# Patient Record
Sex: FEMALE | Race: WHITE | Employment: UNEMPLOYED | URBAN - METROPOLITAN AREA
[De-identification: names, ages, dates, MRNs, and addresses within clinical notes are randomized per-mention and may not be internally consistent; named-entity substitution may affect disease eponyms.]

---

## 2018-03-13 ENCOUNTER — OFFICE VISIT (OUTPATIENT)
Dept: FAMILY MEDICINE CLINIC | Age: 48
End: 2018-03-13
Payer: COMMERCIAL

## 2018-03-13 VITALS
SYSTOLIC BLOOD PRESSURE: 110 MMHG | WEIGHT: 113 LBS | BODY MASS INDEX: 17.13 KG/M2 | DIASTOLIC BLOOD PRESSURE: 60 MMHG | OXYGEN SATURATION: 96 % | HEIGHT: 68 IN | TEMPERATURE: 98.6 F | HEART RATE: 76 BPM

## 2018-03-13 DIAGNOSIS — J98.8 RESPIRATORY INFECTION: Primary | ICD-10-CM

## 2018-03-13 PROCEDURE — 99213 OFFICE O/P EST LOW 20 MIN: CPT | Performed by: NURSE PRACTITIONER

## 2018-03-13 RX ORDER — DOXYCYCLINE HYCLATE 100 MG
100 TABLET ORAL 2 TIMES DAILY
Qty: 20 TABLET | Refills: 0 | Status: SHIPPED | OUTPATIENT
Start: 2018-03-13 | End: 2018-03-23 | Stop reason: CLARIF

## 2018-03-13 NOTE — PROGRESS NOTES
Chief Complaint:   Cough (Pt C/O cough and chest congestion x 3 days )      History of Present Illness   Source of history provided by:  patient. Jaylen Plaza is a 52 y.o. old female with a past medical history of: History reviewed. No pertinent past medical history. presents to the [facility] for nasal congestion which began 14 days ago. States her symptoms have waxed and waned but over the last 4 days her symptoms have worsened. She reports fatigue, productive cough with yellow mucous, sore throat, mild headache, and ear pressure. Denies any CP, SOB, abdominal pain, fever, chills, neck stiffness, rash, or lethargy. She has been taking mucinex which has helped her bring up congestion. ROS    Unless otherwise stated in this report or unable to obtain because of the patient's clinical or mental status as evidenced by the medical record, this patients's positive and negative responses for Review of Systems, constitutional, psych, eyes, ENT, cardiovascular, respiratory, gastrointestinal, neurological, genitourinary, musculoskeletal, integument systems and systems related to the presenting problem are either stated in the preceding or were not pertinent or were negative for the symptoms and/or complaints related to the medical problem. Past Surgical History:  has no past surgical history on file. Social History:  reports that she has never smoked. She has never used smokeless tobacco.  Family History: family history is not on file. Allergies: Patient has no known allergies. Physical Exam         VS:  /60   Pulse 76   Temp 98.6 °F (37 °C) (Temporal)   Ht 5' 8\" (1.727 m)   Wt 113 lb (51.3 kg)   LMP 02/20/2018 (Approximate)   SpO2 96%   BMI 17.18 kg/m²    Oxygen Saturation Interpretation: Normal.    Constitutional:  Alert, development consistent with age. Ears:  External Ears: Bilateral pinna normal. TM's without erythema or perforation bilaterally. Canals normal bilaterally.   No

## 2018-03-19 ENCOUNTER — OFFICE VISIT (OUTPATIENT)
Dept: FAMILY MEDICINE CLINIC | Age: 48
End: 2018-03-19
Payer: COMMERCIAL

## 2018-03-19 VITALS
TEMPERATURE: 98.1 F | DIASTOLIC BLOOD PRESSURE: 70 MMHG | OXYGEN SATURATION: 99 % | SYSTOLIC BLOOD PRESSURE: 130 MMHG | HEIGHT: 68 IN | BODY MASS INDEX: 17.43 KG/M2 | HEART RATE: 65 BPM | WEIGHT: 115 LBS

## 2018-03-19 DIAGNOSIS — J98.01 BRONCHOSPASM: ICD-10-CM

## 2018-03-19 DIAGNOSIS — J20.9 ACUTE BRONCHITIS, UNSPECIFIED ORGANISM: Primary | ICD-10-CM

## 2018-03-19 PROCEDURE — 99213 OFFICE O/P EST LOW 20 MIN: CPT | Performed by: PHYSICIAN ASSISTANT

## 2018-03-19 RX ORDER — ALBUTEROL SULFATE 90 UG/1
2 AEROSOL, METERED RESPIRATORY (INHALATION) EVERY 6 HOURS PRN
Qty: 1 INHALER | Refills: 0 | Status: SHIPPED | OUTPATIENT
Start: 2018-03-19 | End: 2018-07-25

## 2018-03-19 RX ORDER — PREDNISONE 20 MG/1
20 TABLET ORAL 2 TIMES DAILY
Qty: 10 TABLET | Refills: 0 | Status: SHIPPED | OUTPATIENT
Start: 2018-03-19 | End: 2018-03-24

## 2018-03-19 RX ORDER — BENZONATATE 100 MG/1
100 CAPSULE ORAL 3 TIMES DAILY PRN
Qty: 30 CAPSULE | Refills: 0 | Status: SHIPPED | OUTPATIENT
Start: 2018-03-19 | End: 2018-03-29

## 2018-03-23 ENCOUNTER — OFFICE VISIT (OUTPATIENT)
Dept: FAMILY MEDICINE CLINIC | Age: 48
End: 2018-03-23
Payer: COMMERCIAL

## 2018-03-23 VITALS
TEMPERATURE: 98.4 F | HEIGHT: 69 IN | BODY MASS INDEX: 16.44 KG/M2 | SYSTOLIC BLOOD PRESSURE: 118 MMHG | DIASTOLIC BLOOD PRESSURE: 78 MMHG | HEART RATE: 66 BPM | RESPIRATION RATE: 18 BRPM | WEIGHT: 111 LBS | OXYGEN SATURATION: 100 %

## 2018-03-23 DIAGNOSIS — R29.898 MUSCLE FUNCTION LOSS: ICD-10-CM

## 2018-03-23 DIAGNOSIS — R10.11 RIGHT UPPER QUADRANT ABDOMINAL PAIN: ICD-10-CM

## 2018-03-23 DIAGNOSIS — Z00.00 PHYSICAL EXAM: ICD-10-CM

## 2018-03-23 DIAGNOSIS — E78.2 MIXED HYPERLIPIDEMIA: ICD-10-CM

## 2018-03-23 DIAGNOSIS — M25.551 RIGHT HIP PAIN: ICD-10-CM

## 2018-03-23 DIAGNOSIS — M79.641 RIGHT HAND PAIN: Primary | ICD-10-CM

## 2018-03-23 DIAGNOSIS — R73.01 IFG (IMPAIRED FASTING GLUCOSE): ICD-10-CM

## 2018-03-23 DIAGNOSIS — Z12.4 SCREENING FOR CERVICAL CANCER: ICD-10-CM

## 2018-03-23 DIAGNOSIS — R53.83 FATIGUE, UNSPECIFIED TYPE: ICD-10-CM

## 2018-03-23 PROCEDURE — 99215 OFFICE O/P EST HI 40 MIN: CPT | Performed by: FAMILY MEDICINE

## 2018-03-23 ASSESSMENT — PATIENT HEALTH QUESTIONNAIRE - PHQ9
2. FEELING DOWN, DEPRESSED OR HOPELESS: 0
1. LITTLE INTEREST OR PLEASURE IN DOING THINGS: 0
SUM OF ALL RESPONSES TO PHQ QUESTIONS 1-9: 0
SUM OF ALL RESPONSES TO PHQ9 QUESTIONS 1 & 2: 0

## 2018-03-26 ENCOUNTER — HOSPITAL ENCOUNTER (OUTPATIENT)
Age: 48
Discharge: HOME | End: 2018-03-28
Payer: COMMERCIAL

## 2018-03-26 ENCOUNTER — HOSPITAL ENCOUNTER (OUTPATIENT)
Dept: GENERAL RADIOLOGY | Age: 48
Discharge: HOME | End: 2018-03-28
Payer: COMMERCIAL

## 2018-03-26 DIAGNOSIS — M25.551 RIGHT HIP PAIN: Primary | ICD-10-CM

## 2018-03-26 DIAGNOSIS — M25.551 RIGHT HIP PAIN: ICD-10-CM

## 2018-03-26 PROCEDURE — 73502 X-RAY EXAM HIP UNI 2-3 VIEWS: CPT

## 2018-03-27 PROBLEM — E78.2 MIXED HYPERLIPIDEMIA: Status: ACTIVE | Noted: 2018-03-27

## 2018-03-27 PROBLEM — Z00.00 PHYSICAL EXAM: Status: ACTIVE | Noted: 2018-03-27

## 2018-03-27 PROBLEM — R53.83 FATIGUE: Status: ACTIVE | Noted: 2018-03-27

## 2018-03-27 PROBLEM — Z12.4 SCREENING FOR CERVICAL CANCER: Status: ACTIVE | Noted: 2018-03-27

## 2018-03-27 PROBLEM — M79.641 RIGHT HAND PAIN: Status: ACTIVE | Noted: 2018-03-27

## 2018-03-27 PROBLEM — R10.11 RIGHT UPPER QUADRANT ABDOMINAL PAIN: Status: ACTIVE | Noted: 2018-03-27

## 2018-03-27 PROBLEM — M25.551 RIGHT HIP PAIN: Status: ACTIVE | Noted: 2018-03-27

## 2018-03-27 ASSESSMENT — ENCOUNTER SYMPTOMS
EYE REDNESS: 0
SINUS PAIN: 0
RESPIRATORY NEGATIVE: 1
VOMITING: 0
STRIDOR: 0
SHORTNESS OF BREATH: 0
PHOTOPHOBIA: 0
SORE THROAT: 0
BLOOD IN STOOL: 0
ABDOMINAL PAIN: 1
BACK PAIN: 0
HEMOPTYSIS: 0
EYE PAIN: 0
SPUTUM PRODUCTION: 0
DOUBLE VISION: 0
CONSTIPATION: 0
EYE DISCHARGE: 0
EYES NEGATIVE: 1
COUGH: 0
DIARRHEA: 0
NAUSEA: 0
BLURRED VISION: 0
ORTHOPNEA: 0
HEARTBURN: 0
WHEEZING: 0

## 2018-03-28 NOTE — PROGRESS NOTES
Adolph Bolaños is a 52 y.o. female. HPI/Chief C/O:  Chief Complaint   Patient presents with    Hand Pain     Pt reports that she has muscle loss in her hands- had testing done at [facility] for it    Hip Pain     Pt c/o R side hip pain, comes and goes, but has had it for years    Abdominal Pain     Pt c/o RUQ abd pain, reports the area pops out when she moves at times- concerned of hernia    Discuss Medications     MA reviewed med list with pt- pharmacy confirmed   [facility] Maintenance     Reviewed with pt     No Known Allergies  This 52year old female new pt presents for physical exam. Pt has right hand pain with muscle function loss, decreased  and strength. Pt has hyperlipidemia, and fatigue. Pt has ruq pain with occasional bulge for months. Pt c/o intermittent right hip pain for 7 years. ROS:  Review of Systems   Constitutional: Positive for malaise/fatigue. Negative for chills, diaphoresis, fever and weight loss. HENT: Negative. Negative for congestion, ear discharge, ear pain, hearing loss, nosebleeds, sinus pain, sore throat and tinnitus. Eyes: Negative. Negative for blurred vision, double vision, photophobia, pain, discharge and redness. Respiratory: Negative. Negative for cough, hemoptysis, sputum production, shortness of breath, wheezing and stridor. Cardiovascular: Negative. Negative for chest pain, palpitations, orthopnea, claudication, leg swelling and PND. Gastrointestinal: Positive for abdominal pain. Negative for blood in stool, constipation, diarrhea, heartburn, melena, nausea and vomiting. Pt has RUQ pain. Genitourinary: Negative. Negative for dysuria, flank pain, frequency, hematuria and urgency. Musculoskeletal: Positive for joint pain and myalgias. Negative for back pain, falls and neck pain. Skin: Negative. Negative for itching and rash. Neurological: Positive for tingling, sensory change and focal weakness.  Negative for dizziness, tremors, speech change, seizures, loss of consciousness, weakness and headaches. Endo/Heme/Allergies: Negative. Negative for environmental allergies and polydipsia. Does not bruise/bleed easily. Psychiatric/Behavioral: Negative. Negative for depression, hallucinations, memory loss, substance abuse and suicidal ideas. The patient is not nervous/anxious and does not have insomnia. Past Medical/Surgical Hx;  Reviewed with patient  History reviewed. No pertinent past medical history. History reviewed. No pertinent surgical history. Past Family Hx:  Reviewed with patient  History reviewed. No pertinent family history. Social Hx:  Reviewed with patient  Social History   Substance Use Topics    Smoking status: Never Smoker    Smokeless tobacco: Never Used    Alcohol use Not on file       OBJECTIVE  /78   Pulse 66   Temp 98.4 °F (36.9 °C) (Temporal)   Resp 18   Ht 5' 9\" (1.753 m)   Wt 111 lb (50.3 kg)   LMP 02/20/2018 (Approximate)   SpO2 100%   Breastfeeding? No   BMI 16.39 kg/m²     Problem List:  Edita  does not have any pertinent problems on file. PHYS EX:  Physical Exam   Constitutional: She is oriented to person, place, and time. She appears well-developed and well-nourished. No distress. HENT:   Head: Normocephalic and atraumatic. Right Ear: External ear normal.   Left Ear: External ear normal.   Nose: Nose normal.   Mouth/Throat: Oropharynx is clear and moist. No oropharyngeal exudate. Eyes: Conjunctivae and EOM are normal. Pupils are equal, round, and reactive to light. Right eye exhibits no discharge. Left eye exhibits no discharge. No scleral icterus. Neck: Normal range of motion. Neck supple. No JVD present. No tracheal deviation present. No thyromegaly present. Cardiovascular: Normal rate, regular rhythm and intact distal pulses. Exam reveals no gallop and no friction rub. Murmur heard.   Pulmonary/Chest: Effort normal and breath sounds normal. No Panel; Future  -     TSH without Reflex; Future  Not controlled. Lab. IFG (impaired fasting glucose)  -     CBC Auto Differential; Future  -     Comprehensive Metabolic Panel; Future  -     Lipid Panel; Future  -     HEMOGLOBIN A1C; Future  -     Vitamin D 25 Hydrox, D2 & D3; Future  Instructed on lab. Muscle function loss  -     CBC Auto Differential; Future  -     Comprehensive Metabolic Panel; Future  -     DARINEL; Future  -     CCP Antibodies, IGG/IGA; Future  -     Sedimentation Rate; Future  -     Rheumatoid Factor; Future  -     [address], [place] ([place])  Not controlled. Lab, ortho. Screening for cervical cancer  -     CBC Auto Differential; Future  -     Comprehensive Metabolic Panel; Future  -     [facility] - Kaushik Rodríguez DO  Instructed on pap. Right hip pain  -     CBC Auto Differential; Future  -     Comprehensive Metabolic Panel; Future  -     DARINEL; Future  -     CCP Antibodies, IGG/IGA; Future  -     Sedimentation Rate; Future  -     Rheumatoid Factor; Future  -     XR HIP RIGHT LIMITED (1 VW); Future  Not controlled. Lab, xray right hip. Right upper quadrant abdominal pain  -     CBC Auto Differential; Future  -     Comprehensive Metabolic Panel; Future  -     CT ABDOMEN PELVIS W WO CONTRAST Additional Contrast? None; Future  Stable. Lab, CT abdomen. Pt instructed if any worse go ED ASAP.       Outpatient Encounter Prescriptions as of 3/23/2018   Medication Sig Dispense Refill    benzonatate (TESSALON PERLES) 100 MG capsule Take 1 capsule by mouth 3 times daily as needed for Cough 30 capsule 0    [] predniSONE (DELTASONE) 20 MG tablet Take 1 tablet by mouth 2 times daily for 5 days 10 tablet 0    albuterol sulfate HFA (PROAIR HFA) 108 (90 Base) MCG/ACT inhaler Inhale 2 puffs into the lungs every 6 hours as needed for Wheezing 1 Inhaler 0    [DISCONTINUED] doxycycline hyclate (VIBRA-TABS) 100 MG tablet Take 1 tablet by mouth 2 times daily for 10 days 20 tablet 0     No facility-administered encounter medications on file as of 3/23/2018. Return in about 4 weeks (around 4/20/2018) for recheck fasting lab in 1 week.         Reviewed recent labs related to Edita's current problems      Discussed importance of regular Health Maintenance follow up  Health Maintenance   Topic    HIV screen     DTaP/Tdap/Td vaccine (1 - Tdap)    Cervical cancer screen     Lipid screen     Flu vaccine (1)

## 2018-03-29 ENCOUNTER — HOSPITAL ENCOUNTER (OUTPATIENT)
Age: 48
Discharge: HOME | End: 2018-03-31
Payer: COMMERCIAL

## 2018-03-29 ENCOUNTER — NURSE ONLY (OUTPATIENT)
Dept: FAMILY MEDICINE CLINIC | Age: 48
End: 2018-03-29
Payer: COMMERCIAL

## 2018-03-29 DIAGNOSIS — M25.551 RIGHT HIP PAIN: ICD-10-CM

## 2018-03-29 DIAGNOSIS — R53.83 FATIGUE, UNSPECIFIED TYPE: ICD-10-CM

## 2018-03-29 DIAGNOSIS — E78.2 MIXED HYPERLIPIDEMIA: ICD-10-CM

## 2018-03-29 DIAGNOSIS — Z12.4 SCREENING FOR CERVICAL CANCER: ICD-10-CM

## 2018-03-29 DIAGNOSIS — M79.641 RIGHT HAND PAIN: ICD-10-CM

## 2018-03-29 DIAGNOSIS — R10.11 RIGHT UPPER QUADRANT ABDOMINAL PAIN: ICD-10-CM

## 2018-03-29 DIAGNOSIS — Z00.00 PHYSICAL EXAM: ICD-10-CM

## 2018-03-29 DIAGNOSIS — R73.01 IFG (IMPAIRED FASTING GLUCOSE): ICD-10-CM

## 2018-03-29 DIAGNOSIS — R29.898 MUSCLE FUNCTION LOSS: ICD-10-CM

## 2018-03-29 LAB
ALBUMIN SERPL-MCNC: 4.1 G/DL (ref 3.5–5.2)
ALP BLD-CCNC: 65 U/L (ref 35–104)
ALT SERPL-CCNC: 9 U/L (ref 0–32)
ANION GAP SERPL CALCULATED.3IONS-SCNC: 16 MMOL/L (ref 7–16)
AST SERPL-CCNC: 16 U/L (ref 0–31)
BASOPHILS ABSOLUTE: 0.06 E9/L (ref 0–0.2)
BASOPHILS RELATIVE PERCENT: 1.3 % (ref 0–2)
BILIRUB SERPL-MCNC: 0.5 MG/DL (ref 0–1.2)
BUN BLDV-MCNC: 10 MG/DL (ref 6–20)
CALCIUM SERPL-MCNC: 9 MG/DL (ref 8.6–10.2)
CHLORIDE BLD-SCNC: 100 MMOL/L (ref 98–107)
CHOLESTEROL, TOTAL: 185 MG/DL (ref 0–199)
CO2: 24 MMOL/L (ref 22–29)
CREAT SERPL-MCNC: 0.8 MG/DL (ref 0.5–1)
EOSINOPHILS ABSOLUTE: 0.21 E9/L (ref 0.05–0.5)
EOSINOPHILS RELATIVE PERCENT: 4.6 % (ref 0–6)
GFR AFRICAN AMERICAN: >60
GFR NON-AFRICAN AMERICAN: >60 ML/MIN/1.73
GLUCOSE BLD-MCNC: 90 MG/DL (ref 74–109)
HBA1C MFR BLD: 5.9 % (ref 4.8–5.9)
HCT VFR BLD CALC: 40.2 % (ref 34–48)
HDLC SERPL-MCNC: 80 MG/DL
HEMOGLOBIN: 12.6 G/DL (ref 11.5–15.5)
IMMATURE GRANULOCYTES #: 0.01 E9/L
IMMATURE GRANULOCYTES %: 0.2 % (ref 0–5)
LDL CHOLESTEROL CALCULATED: 90 MG/DL (ref 0–99)
LYMPHOCYTES ABSOLUTE: 0.91 E9/L (ref 1.5–4)
LYMPHOCYTES RELATIVE PERCENT: 19.9 % (ref 20–42)
MCH RBC QN AUTO: 31 PG (ref 26–35)
MCHC RBC AUTO-ENTMCNC: 31.3 % (ref 32–34.5)
MCV RBC AUTO: 99 FL (ref 80–99.9)
MONOCYTES ABSOLUTE: 0.34 E9/L (ref 0.1–0.95)
MONOCYTES RELATIVE PERCENT: 7.4 % (ref 2–12)
NEUTROPHILS ABSOLUTE: 3.05 E9/L (ref 1.8–7.3)
NEUTROPHILS RELATIVE PERCENT: 66.6 % (ref 43–80)
PDW BLD-RTO: 13.5 FL (ref 11.5–15)
PLATELET # BLD: 310 E9/L (ref 130–450)
PMV BLD AUTO: 10.2 FL (ref 7–12)
POTASSIUM SERPL-SCNC: 4.3 MMOL/L (ref 3.5–5)
RBC # BLD: 4.06 E12/L (ref 3.5–5.5)
RHEUMATOID FACTOR: 12 IU/ML (ref 0–13)
SEDIMENTATION RATE, ERYTHROCYTE: 20 MM/HR (ref 0–20)
SODIUM BLD-SCNC: 140 MMOL/L (ref 132–146)
TOTAL PROTEIN: 7.5 G/DL (ref 6.4–8.3)
TRIGL SERPL-MCNC: 77 MG/DL (ref 0–149)
TSH SERPL DL<=0.05 MIU/L-ACNC: 2.4 UIU/ML (ref 0.27–4.2)
VITAMIN D 25-HYDROXY: 18 NG/ML (ref 30–100)
VLDLC SERPL CALC-MCNC: 15 MG/DL
WBC # BLD: 4.6 E9/L (ref 4.5–11.5)

## 2018-03-29 PROCEDURE — 83036 HEMOGLOBIN GLYCOSYLATED A1C: CPT

## 2018-03-29 PROCEDURE — 85025 COMPLETE CBC W/AUTO DIFF WBC: CPT

## 2018-03-29 PROCEDURE — 85651 RBC SED RATE NONAUTOMATED: CPT

## 2018-03-29 PROCEDURE — 80061 LIPID PANEL: CPT

## 2018-03-29 PROCEDURE — 80053 COMPREHEN METABOLIC PANEL: CPT

## 2018-03-29 PROCEDURE — 36415 COLL VENOUS BLD VENIPUNCTURE: CPT | Performed by: FAMILY MEDICINE

## 2018-03-29 PROCEDURE — 84443 ASSAY THYROID STIM HORMONE: CPT

## 2018-03-29 PROCEDURE — 86431 RHEUMATOID FACTOR QUANT: CPT

## 2018-03-29 PROCEDURE — 82306 VITAMIN D 25 HYDROXY: CPT

## 2018-03-29 PROCEDURE — 86200 CCP ANTIBODY: CPT

## 2018-03-29 PROCEDURE — 86038 ANTINUCLEAR ANTIBODIES: CPT

## 2018-03-30 LAB — ANTI-NUCLEAR ANTIBODY (ANA): NEGATIVE

## 2018-04-02 LAB
Lab: NORMAL
REPORT: NORMAL
THIS TEST SENT TO: NORMAL

## 2018-04-02 RX ORDER — ERGOCALCIFEROL 1.25 MG/1
50000 CAPSULE ORAL WEEKLY
Qty: 4 CAPSULE | Refills: 3 | Status: SHIPPED | OUTPATIENT
Start: 2018-04-02 | End: 2018-06-27 | Stop reason: SDUPTHER

## 2018-04-03 ENCOUNTER — TELEPHONE (OUTPATIENT)
Dept: FAMILY MEDICINE CLINIC | Age: 48
End: 2018-04-03

## 2018-04-03 DIAGNOSIS — R10.11 RUQ ABDOMINAL PAIN: Primary | ICD-10-CM

## 2018-04-09 ENCOUNTER — HOSPITAL ENCOUNTER (OUTPATIENT)
Dept: ULTRASOUND IMAGING | Age: 48
Discharge: HOME | End: 2018-04-09
Payer: COMMERCIAL

## 2018-04-09 DIAGNOSIS — R10.11 RUQ ABDOMINAL PAIN: ICD-10-CM

## 2018-04-09 PROCEDURE — 76705 ECHO EXAM OF ABDOMEN: CPT

## 2018-04-10 DIAGNOSIS — K80.11 CALCULUS OF GALLBLADDER WITH CHOLECYSTITIS WITH BILIARY OBSTRUCTION, UNSPECIFIED CHOLECYSTITIS ACUITY: Primary | ICD-10-CM

## 2018-04-16 ENCOUNTER — HOSPITAL ENCOUNTER (EMERGENCY)
Age: 48
Discharge: HOME | End: 2018-04-16
Payer: COMMERCIAL

## 2018-04-16 ENCOUNTER — APPOINTMENT (OUTPATIENT)
Dept: GENERAL RADIOLOGY | Age: 48
End: 2018-04-16
Payer: COMMERCIAL

## 2018-04-16 VITALS
OXYGEN SATURATION: 100 % | WEIGHT: 113 LBS | HEART RATE: 78 BPM | HEIGHT: 67 IN | DIASTOLIC BLOOD PRESSURE: 92 MMHG | SYSTOLIC BLOOD PRESSURE: 154 MMHG | BODY MASS INDEX: 17.74 KG/M2 | TEMPERATURE: 97.5 F | RESPIRATION RATE: 16 BRPM

## 2018-04-16 DIAGNOSIS — K80.50 CHOLEDOCHOLITHIASIS: Primary | ICD-10-CM

## 2018-04-16 LAB
ALBUMIN SERPL-MCNC: 4.7 G/DL (ref 3.5–5.2)
ALP BLD-CCNC: 73 U/L (ref 35–104)
ALT SERPL-CCNC: 10 U/L (ref 0–32)
AMYLASE: 66 U/L (ref 20–100)
ANION GAP SERPL CALCULATED.3IONS-SCNC: 11 MMOL/L (ref 7–16)
AST SERPL-CCNC: 19 U/L (ref 0–31)
BACTERIA: NORMAL /HPF
BASOPHILS ABSOLUTE: 0.04 E9/L (ref 0–0.2)
BASOPHILS RELATIVE PERCENT: 0.5 % (ref 0–2)
BILIRUB SERPL-MCNC: 0.4 MG/DL (ref 0–1.2)
BILIRUBIN URINE: NEGATIVE
BLOOD, URINE: ABNORMAL
BUN BLDV-MCNC: 8 MG/DL (ref 6–20)
CALCIUM SERPL-MCNC: 9.4 MG/DL (ref 8.6–10.2)
CHLORIDE BLD-SCNC: 97 MMOL/L (ref 98–107)
CHP ED QC CHECK: NORMAL
CLARITY: CLEAR
CO2: 30 MMOL/L (ref 22–29)
COLOR: YELLOW
CREAT SERPL-MCNC: 0.7 MG/DL (ref 0.5–1)
EOSINOPHILS ABSOLUTE: 0.07 E9/L (ref 0.05–0.5)
EOSINOPHILS RELATIVE PERCENT: 0.9 % (ref 0–6)
EPITHELIAL CELLS, UA: NORMAL /HPF
GFR AFRICAN AMERICAN: >60
GFR NON-AFRICAN AMERICAN: >60 ML/MIN/1.73
GLUCOSE BLD-MCNC: 100 MG/DL (ref 74–109)
GLUCOSE URINE: NEGATIVE MG/DL
HCT VFR BLD CALC: 41.7 % (ref 34–48)
HEMOGLOBIN: 13.8 G/DL (ref 11.5–15.5)
IMMATURE GRANULOCYTES #: 0.02 E9/L
IMMATURE GRANULOCYTES %: 0.3 % (ref 0–5)
KETONES, URINE: NEGATIVE MG/DL
LACTIC ACID: 0.9 MMOL/L (ref 0.5–2.2)
LEUKOCYTE ESTERASE, URINE: NEGATIVE
LIPASE: 30 U/L (ref 13–60)
LYMPHOCYTES ABSOLUTE: 1.04 E9/L (ref 1.5–4)
LYMPHOCYTES RELATIVE PERCENT: 14 % (ref 20–42)
MCH RBC QN AUTO: 31.9 PG (ref 26–35)
MCHC RBC AUTO-ENTMCNC: 33.1 % (ref 32–34.5)
MCV RBC AUTO: 96.5 FL (ref 80–99.9)
MONOCYTES ABSOLUTE: 0.41 E9/L (ref 0.1–0.95)
MONOCYTES RELATIVE PERCENT: 5.5 % (ref 2–12)
NEUTROPHILS ABSOLUTE: 5.87 E9/L (ref 1.8–7.3)
NEUTROPHILS RELATIVE PERCENT: 78.8 % (ref 43–80)
NITRITE, URINE: NEGATIVE
PDW BLD-RTO: 13.4 FL (ref 11.5–15)
PH UA: 7 (ref 5–9)
PLATELET # BLD: 303 E9/L (ref 130–450)
PMV BLD AUTO: 9.5 FL (ref 7–12)
POTASSIUM SERPL-SCNC: 3.8 MMOL/L (ref 3.5–5)
PREGNANCY TEST URINE, POC: NORMAL
PROTEIN UA: NEGATIVE MG/DL
RBC # BLD: 4.32 E12/L (ref 3.5–5.5)
RBC UA: NORMAL /HPF (ref 0–2)
SODIUM BLD-SCNC: 138 MMOL/L (ref 132–146)
SPECIFIC GRAVITY UA: <=1.005 (ref 1–1.03)
TOTAL PROTEIN: 8.8 G/DL (ref 6.4–8.3)
UROBILINOGEN, URINE: 0.2 E.U./DL
WBC # BLD: 7.5 E9/L (ref 4.5–11.5)
WBC UA: NORMAL /HPF (ref 0–5)

## 2018-04-16 PROCEDURE — 74022 RADEX COMPL AQT ABD SERIES: CPT

## 2018-04-16 PROCEDURE — 83605 ASSAY OF LACTIC ACID: CPT

## 2018-04-16 PROCEDURE — 82150 ASSAY OF AMYLASE: CPT

## 2018-04-16 PROCEDURE — 81001 URINALYSIS AUTO W/SCOPE: CPT

## 2018-04-16 PROCEDURE — 87088 URINE BACTERIA CULTURE: CPT

## 2018-04-16 PROCEDURE — 36415 COLL VENOUS BLD VENIPUNCTURE: CPT

## 2018-04-16 PROCEDURE — 80053 COMPREHEN METABOLIC PANEL: CPT

## 2018-04-16 PROCEDURE — 99284 EMERGENCY DEPT VISIT MOD MDM: CPT | Performed by: SURGERY

## 2018-04-16 PROCEDURE — 85025 COMPLETE CBC W/AUTO DIFF WBC: CPT

## 2018-04-16 PROCEDURE — 99284 EMERGENCY DEPT VISIT MOD MDM: CPT

## 2018-04-16 PROCEDURE — 83690 ASSAY OF LIPASE: CPT

## 2018-04-16 ASSESSMENT — ENCOUNTER SYMPTOMS
SORE THROAT: 0
SINUS PRESSURE: 0
BACK PAIN: 0
WHEEZING: 0
ABDOMINAL PAIN: 1
DIARRHEA: 0
SHORTNESS OF BREATH: 0
VOMITING: 0
EYE PAIN: 0
COUGH: 1
EYE REDNESS: 0
EYE DISCHARGE: 0
NAUSEA: 0
ABDOMINAL DISTENTION: 0

## 2018-04-16 ASSESSMENT — PAIN SCALES - GENERAL: PAINLEVEL_OUTOF10: 8

## 2018-04-17 ENCOUNTER — TELEPHONE (OUTPATIENT)
Dept: SURGERY | Age: 48
End: 2018-04-17

## 2018-04-18 ENCOUNTER — ANESTHESIA EVENT (OUTPATIENT)
Dept: OPERATING ROOM | Age: 48
End: 2018-04-18
Payer: COMMERCIAL

## 2018-04-18 LAB — URINE CULTURE, ROUTINE: NORMAL

## 2018-04-19 ENCOUNTER — HOSPITAL ENCOUNTER (OUTPATIENT)
Dept: GENERAL RADIOLOGY | Age: 48
Discharge: HOME | End: 2018-04-21
Payer: COMMERCIAL

## 2018-04-19 ENCOUNTER — HOSPITAL ENCOUNTER (OUTPATIENT)
Age: 48
Setting detail: OUTPATIENT SURGERY
Discharge: HOME | End: 2018-04-19
Attending: SURGERY | Admitting: SURGERY
Payer: COMMERCIAL

## 2018-04-19 ENCOUNTER — ANESTHESIA (OUTPATIENT)
Dept: OPERATING ROOM | Age: 48
End: 2018-04-19
Payer: COMMERCIAL

## 2018-04-19 VITALS
TEMPERATURE: 97.1 F | SYSTOLIC BLOOD PRESSURE: 110 MMHG | BODY MASS INDEX: 17.43 KG/M2 | HEIGHT: 68 IN | OXYGEN SATURATION: 100 % | RESPIRATION RATE: 20 BRPM | WEIGHT: 115 LBS | HEART RATE: 61 BPM | DIASTOLIC BLOOD PRESSURE: 60 MMHG

## 2018-04-19 VITALS
SYSTOLIC BLOOD PRESSURE: 111 MMHG | TEMPERATURE: 98.6 F | DIASTOLIC BLOOD PRESSURE: 60 MMHG | OXYGEN SATURATION: 100 % | RESPIRATION RATE: 10 BRPM

## 2018-04-19 DIAGNOSIS — K80.10 CALCULUS OF GALLBLADDER WITH CHOLECYSTITIS WITHOUT BILIARY OBSTRUCTION, UNSPECIFIED CHOLECYSTITIS ACUITY: ICD-10-CM

## 2018-04-19 DIAGNOSIS — K80.20 SYMPTOMATIC CHOLELITHIASIS: Primary | ICD-10-CM

## 2018-04-19 LAB — HCG(URINE) PREGNANCY TEST: NEGATIVE

## 2018-04-19 PROCEDURE — 2580000003 HC RX 258: Performed by: SURGERY

## 2018-04-19 PROCEDURE — 6370000000 HC RX 637 (ALT 250 FOR IP): Performed by: ANESTHESIOLOGY

## 2018-04-19 PROCEDURE — 3700000000 HC ANESTHESIA ATTENDED CARE: Performed by: SURGERY

## 2018-04-19 PROCEDURE — 74300 X-RAY BILE DUCTS/PANCREAS: CPT

## 2018-04-19 PROCEDURE — 6360000002 HC RX W HCPCS: Performed by: SURGERY

## 2018-04-19 PROCEDURE — C1894 INTRO/SHEATH, NON-LASER: HCPCS | Performed by: SURGERY

## 2018-04-19 PROCEDURE — 2580000003 HC RX 258: Performed by: NURSE ANESTHETIST, CERTIFIED REGISTERED

## 2018-04-19 PROCEDURE — 7100000010 HC PHASE II RECOVERY - FIRST 15 MIN: Performed by: SURGERY

## 2018-04-19 PROCEDURE — 88304 TISSUE EXAM BY PATHOLOGIST: CPT

## 2018-04-19 PROCEDURE — 7100000000 HC PACU RECOVERY - FIRST 15 MIN: Performed by: SURGERY

## 2018-04-19 PROCEDURE — 2500000003 HC RX 250 WO HCPCS: Performed by: NURSE ANESTHETIST, CERTIFIED REGISTERED

## 2018-04-19 PROCEDURE — 3600000014 HC SURGERY LEVEL 4 ADDTL 15MIN: Performed by: SURGERY

## 2018-04-19 PROCEDURE — 6360000002 HC RX W HCPCS: Performed by: NURSE ANESTHETIST, CERTIFIED REGISTERED

## 2018-04-19 PROCEDURE — 81025 URINE PREGNANCY TEST: CPT

## 2018-04-19 PROCEDURE — 7100000011 HC PHASE II RECOVERY - ADDTL 15 MIN: Performed by: SURGERY

## 2018-04-19 PROCEDURE — 7100000001 HC PACU RECOVERY - ADDTL 15 MIN: Performed by: SURGERY

## 2018-04-19 PROCEDURE — 3600000004 HC SURGERY LEVEL 4 BASE: Performed by: SURGERY

## 2018-04-19 PROCEDURE — 2500000003 HC RX 250 WO HCPCS: Performed by: SURGERY

## 2018-04-19 PROCEDURE — 47563 LAPARO CHOLECYSTECTOMY/GRAPH: CPT | Performed by: SURGERY

## 2018-04-19 PROCEDURE — 3700000001 HC ADD 15 MINUTES (ANESTHESIA): Performed by: SURGERY

## 2018-04-19 PROCEDURE — 2709999900 HC NON-CHARGEABLE SUPPLY: Performed by: SURGERY

## 2018-04-19 PROCEDURE — 6360000004 HC RX CONTRAST MEDICATION: Performed by: SURGERY

## 2018-04-19 RX ORDER — OXYCODONE HYDROCHLORIDE AND ACETAMINOPHEN 5; 325 MG/1; MG/1
1 TABLET ORAL ONCE
Status: COMPLETED | OUTPATIENT
Start: 2018-04-19 | End: 2018-04-19

## 2018-04-19 RX ORDER — SODIUM CHLORIDE, SODIUM LACTATE, POTASSIUM CHLORIDE, CALCIUM CHLORIDE 600; 310; 30; 20 MG/100ML; MG/100ML; MG/100ML; MG/100ML
INJECTION, SOLUTION INTRAVENOUS CONTINUOUS PRN
Status: DISCONTINUED | OUTPATIENT
Start: 2018-04-19 | End: 2018-04-19 | Stop reason: SDUPTHER

## 2018-04-19 RX ORDER — GLYCOPYRROLATE 1 MG/5 ML
SYRINGE (ML) INTRAVENOUS PRN
Status: DISCONTINUED | OUTPATIENT
Start: 2018-04-19 | End: 2018-04-19 | Stop reason: SDUPTHER

## 2018-04-19 RX ORDER — MEPERIDINE HYDROCHLORIDE 25 MG/ML
12.5 INJECTION INTRAMUSCULAR; INTRAVENOUS; SUBCUTANEOUS EVERY 5 MIN PRN
Status: DISCONTINUED | OUTPATIENT
Start: 2018-04-19 | End: 2018-04-19 | Stop reason: HOSPADM

## 2018-04-19 RX ORDER — NEOSTIGMINE METHYLSULFATE 0.5 MG/ML
INJECTION, SOLUTION INTRAVENOUS PRN
Status: DISCONTINUED | OUTPATIENT
Start: 2018-04-19 | End: 2018-04-19 | Stop reason: SDUPTHER

## 2018-04-19 RX ORDER — MIDAZOLAM HYDROCHLORIDE 1 MG/ML
INJECTION INTRAMUSCULAR; INTRAVENOUS PRN
Status: DISCONTINUED | OUTPATIENT
Start: 2018-04-19 | End: 2018-04-19 | Stop reason: SDUPTHER

## 2018-04-19 RX ORDER — ROCURONIUM BROMIDE 10 MG/ML
INJECTION, SOLUTION INTRAVENOUS PRN
Status: DISCONTINUED | OUTPATIENT
Start: 2018-04-19 | End: 2018-04-19 | Stop reason: SDUPTHER

## 2018-04-19 RX ORDER — SODIUM CHLORIDE 0.9 % (FLUSH) 0.9 %
10 SYRINGE (ML) INJECTION EVERY 12 HOURS SCHEDULED
Status: DISCONTINUED | OUTPATIENT
Start: 2018-04-19 | End: 2018-04-19 | Stop reason: HOSPADM

## 2018-04-19 RX ORDER — FENTANYL CITRATE 50 UG/ML
INJECTION, SOLUTION INTRAMUSCULAR; INTRAVENOUS PRN
Status: DISCONTINUED | OUTPATIENT
Start: 2018-04-19 | End: 2018-04-19 | Stop reason: SDUPTHER

## 2018-04-19 RX ORDER — SODIUM CHLORIDE 9 MG/ML
INJECTION, SOLUTION INTRAVENOUS CONTINUOUS
Status: DISCONTINUED | OUTPATIENT
Start: 2018-04-19 | End: 2018-04-19 | Stop reason: HOSPADM

## 2018-04-19 RX ORDER — OXYCODONE HYDROCHLORIDE AND ACETAMINOPHEN 5; 325 MG/1; MG/1
1 TABLET ORAL EVERY 6 HOURS PRN
Qty: 20 TABLET | Refills: 0 | Status: SHIPPED | OUTPATIENT
Start: 2018-04-19 | End: 2018-04-26

## 2018-04-19 RX ORDER — SODIUM CHLORIDE 0.9 % (FLUSH) 0.9 %
10 SYRINGE (ML) INJECTION PRN
Status: DISCONTINUED | OUTPATIENT
Start: 2018-04-19 | End: 2018-04-19 | Stop reason: HOSPADM

## 2018-04-19 RX ORDER — PROPOFOL 10 MG/ML
INJECTION, EMULSION INTRAVENOUS PRN
Status: DISCONTINUED | OUTPATIENT
Start: 2018-04-19 | End: 2018-04-19 | Stop reason: SDUPTHER

## 2018-04-19 RX ORDER — IBUPROFEN 200 MG
800 TABLET ORAL EVERY 6 HOURS PRN
Qty: 50 TABLET | Refills: 0 | Status: SHIPPED | OUTPATIENT
Start: 2018-04-19 | End: 2018-05-02

## 2018-04-19 RX ORDER — BUPIVACAINE HYDROCHLORIDE AND EPINEPHRINE 2.5; 5 MG/ML; UG/ML
INJECTION, SOLUTION EPIDURAL; INFILTRATION; INTRACAUDAL; PERINEURAL PRN
Status: DISCONTINUED | OUTPATIENT
Start: 2018-04-19 | End: 2018-04-19 | Stop reason: HOSPADM

## 2018-04-19 RX ORDER — DEXAMETHASONE SODIUM PHOSPHATE 10 MG/ML
INJECTION INTRAMUSCULAR; INTRAVENOUS PRN
Status: DISCONTINUED | OUTPATIENT
Start: 2018-04-19 | End: 2018-04-19 | Stop reason: SDUPTHER

## 2018-04-19 RX ORDER — DOCUSATE SODIUM 100 MG/1
100 CAPSULE, LIQUID FILLED ORAL DAILY PRN
Qty: 30 CAPSULE | Refills: 0 | Status: SHIPPED | OUTPATIENT
Start: 2018-04-19 | End: 2018-05-02

## 2018-04-19 RX ORDER — ONDANSETRON 2 MG/ML
INJECTION INTRAMUSCULAR; INTRAVENOUS PRN
Status: DISCONTINUED | OUTPATIENT
Start: 2018-04-19 | End: 2018-04-19 | Stop reason: SDUPTHER

## 2018-04-19 RX ADMIN — FENTANYL CITRATE 50 MCG: 50 INJECTION, SOLUTION INTRAMUSCULAR; INTRAVENOUS at 09:25

## 2018-04-19 RX ADMIN — PROPOFOL 140 MG: 10 INJECTION, EMULSION INTRAVENOUS at 09:12

## 2018-04-19 RX ADMIN — FENTANYL CITRATE 100 MCG: 50 INJECTION, SOLUTION INTRAMUSCULAR; INTRAVENOUS at 09:12

## 2018-04-19 RX ADMIN — SODIUM CHLORIDE: 9 INJECTION, SOLUTION INTRAVENOUS at 08:09

## 2018-04-19 RX ADMIN — SODIUM CHLORIDE, POTASSIUM CHLORIDE, SODIUM LACTATE AND CALCIUM CHLORIDE: 600; 310; 30; 20 INJECTION, SOLUTION INTRAVENOUS at 09:07

## 2018-04-19 RX ADMIN — Medication 0.6 MG: at 09:44

## 2018-04-19 RX ADMIN — CEFAZOLIN SODIUM 2 G: 2 SOLUTION INTRAVENOUS at 09:07

## 2018-04-19 RX ADMIN — LIDOCAINE HYDROCHLORIDE 40 MG: 20 INJECTION, SOLUTION INTRAVENOUS at 09:12

## 2018-04-19 RX ADMIN — FENTANYL CITRATE 25 MCG: 50 INJECTION, SOLUTION INTRAMUSCULAR; INTRAVENOUS at 09:45

## 2018-04-19 RX ADMIN — ONDANSETRON 4 MG: 2 INJECTION INTRAMUSCULAR; INTRAVENOUS at 09:33

## 2018-04-19 RX ADMIN — FENTANYL CITRATE 25 MCG: 50 INJECTION, SOLUTION INTRAMUSCULAR; INTRAVENOUS at 09:46

## 2018-04-19 RX ADMIN — FENTANYL CITRATE 50 MCG: 50 INJECTION, SOLUTION INTRAMUSCULAR; INTRAVENOUS at 09:19

## 2018-04-19 RX ADMIN — MIDAZOLAM 2 MG: 1 INJECTION INTRAMUSCULAR; INTRAVENOUS at 09:02

## 2018-04-19 RX ADMIN — NEOSTIGMINE METHYLSULFATE 3 MG: 0.5 INJECTION INTRAVENOUS at 09:44

## 2018-04-19 RX ADMIN — ROCURONIUM BROMIDE 40 MG: 10 INJECTION, SOLUTION INTRAVENOUS at 09:12

## 2018-04-19 RX ADMIN — DEXAMETHASONE SODIUM PHOSPHATE 10 MG: 10 INJECTION INTRAMUSCULAR; INTRAVENOUS at 09:32

## 2018-04-19 RX ADMIN — OXYCODONE HYDROCHLORIDE AND ACETAMINOPHEN 1 TABLET: 5; 325 TABLET ORAL at 11:18

## 2018-04-19 ASSESSMENT — PULMONARY FUNCTION TESTS
PIF_VALUE: 23
PIF_VALUE: 12
PIF_VALUE: 26
PIF_VALUE: 5
PIF_VALUE: 12
PIF_VALUE: 3
PIF_VALUE: 15
PIF_VALUE: 24
PIF_VALUE: 24
PIF_VALUE: 15
PIF_VALUE: 24
PIF_VALUE: 24
PIF_VALUE: 16
PIF_VALUE: 25
PIF_VALUE: 20
PIF_VALUE: 15
PIF_VALUE: 26
PIF_VALUE: 25
PIF_VALUE: 4
PIF_VALUE: 30
PIF_VALUE: 26
PIF_VALUE: 19
PIF_VALUE: 25
PIF_VALUE: 1
PIF_VALUE: 24
PIF_VALUE: 16
PIF_VALUE: 3
PIF_VALUE: 18
PIF_VALUE: 26
PIF_VALUE: 26
PIF_VALUE: 12
PIF_VALUE: 15
PIF_VALUE: 25
PIF_VALUE: 17
PIF_VALUE: 14
PIF_VALUE: 28
PIF_VALUE: 0
PIF_VALUE: 17
PIF_VALUE: 25
PIF_VALUE: 25
PIF_VALUE: 18
PIF_VALUE: 12
PIF_VALUE: 17
PIF_VALUE: 24
PIF_VALUE: 22

## 2018-04-19 ASSESSMENT — PAIN DESCRIPTION - LOCATION
LOCATION: ABDOMEN
LOCATION: ABDOMEN

## 2018-04-19 ASSESSMENT — PAIN - FUNCTIONAL ASSESSMENT: PAIN_FUNCTIONAL_ASSESSMENT: 0-10

## 2018-04-19 ASSESSMENT — PAIN DESCRIPTION - PAIN TYPE
TYPE: SURGICAL PAIN
TYPE: SURGICAL PAIN

## 2018-04-19 ASSESSMENT — PAIN SCALES - GENERAL
PAINLEVEL_OUTOF10: 0
PAINLEVEL_OUTOF10: 0
PAINLEVEL_OUTOF10: 2
PAINLEVEL_OUTOF10: 0

## 2018-04-26 PROBLEM — Z00.00 PHYSICAL EXAM: Status: RESOLVED | Noted: 2018-03-27 | Resolved: 2018-04-26

## 2018-04-26 PROBLEM — Z12.4 SCREENING FOR CERVICAL CANCER: Status: RESOLVED | Noted: 2018-03-27 | Resolved: 2018-04-26

## 2018-04-27 ENCOUNTER — TELEPHONE (OUTPATIENT)
Dept: ORTHOPEDIC SURGERY | Age: 48
End: 2018-04-27

## 2018-04-27 DIAGNOSIS — M79.641 RIGHT HAND PAIN: Primary | ICD-10-CM

## 2018-04-30 ENCOUNTER — OFFICE VISIT (OUTPATIENT)
Dept: ORTHOPEDIC SURGERY | Age: 48
End: 2018-04-30
Payer: COMMERCIAL

## 2018-04-30 VITALS
RESPIRATION RATE: 20 BRPM | SYSTOLIC BLOOD PRESSURE: 134 MMHG | DIASTOLIC BLOOD PRESSURE: 78 MMHG | HEIGHT: 68 IN | WEIGHT: 115 LBS | TEMPERATURE: 97.3 F | BODY MASS INDEX: 17.43 KG/M2 | HEART RATE: 65 BPM

## 2018-04-30 DIAGNOSIS — G56.03 BILATERAL CARPAL TUNNEL SYNDROME: ICD-10-CM

## 2018-04-30 DIAGNOSIS — M62.541 ATROPHY OF MUSCLE OF RIGHT HAND: ICD-10-CM

## 2018-04-30 DIAGNOSIS — G56.23 ULNAR NEUROPATHY OF BOTH UPPER EXTREMITIES: Primary | ICD-10-CM

## 2018-04-30 PROCEDURE — 99203 OFFICE O/P NEW LOW 30 MIN: CPT | Performed by: ORTHOPAEDIC SURGERY

## 2018-05-02 ENCOUNTER — OFFICE VISIT (OUTPATIENT)
Dept: SURGERY | Age: 48
End: 2018-05-02

## 2018-05-02 VITALS
TEMPERATURE: 98.6 F | HEART RATE: 82 BPM | SYSTOLIC BLOOD PRESSURE: 132 MMHG | HEIGHT: 68 IN | DIASTOLIC BLOOD PRESSURE: 78 MMHG | BODY MASS INDEX: 17.43 KG/M2 | WEIGHT: 115 LBS | RESPIRATION RATE: 12 BRPM

## 2018-05-02 DIAGNOSIS — Z90.49 S/P LAPAROSCOPIC CHOLECYSTECTOMY: Primary | ICD-10-CM

## 2018-05-02 DIAGNOSIS — K81.1 CHRONIC CHOLECYSTITIS: ICD-10-CM

## 2018-05-02 PROCEDURE — 99024 POSTOP FOLLOW-UP VISIT: CPT | Performed by: SURGERY

## 2018-05-08 ENCOUNTER — OFFICE VISIT (OUTPATIENT)
Dept: FAMILY MEDICINE CLINIC | Age: 48
End: 2018-05-08
Payer: COMMERCIAL

## 2018-05-08 ENCOUNTER — NURSE ONLY (OUTPATIENT)
Dept: FAMILY MEDICINE CLINIC | Age: 48
End: 2018-05-08

## 2018-05-08 VITALS
HEART RATE: 69 BPM | WEIGHT: 109.4 LBS | OXYGEN SATURATION: 97 % | DIASTOLIC BLOOD PRESSURE: 80 MMHG | BODY MASS INDEX: 16.58 KG/M2 | RESPIRATION RATE: 18 BRPM | HEIGHT: 68 IN | SYSTOLIC BLOOD PRESSURE: 118 MMHG

## 2018-05-08 DIAGNOSIS — E78.2 MIXED HYPERLIPIDEMIA: ICD-10-CM

## 2018-05-08 DIAGNOSIS — R79.89 LOW VITAMIN D LEVEL: ICD-10-CM

## 2018-05-08 DIAGNOSIS — M79.641 RIGHT HAND PAIN: ICD-10-CM

## 2018-05-08 DIAGNOSIS — R73.01 IFG (IMPAIRED FASTING GLUCOSE): ICD-10-CM

## 2018-05-08 DIAGNOSIS — R53.83 FATIGUE, UNSPECIFIED TYPE: ICD-10-CM

## 2018-05-08 DIAGNOSIS — M62.541 ATROPHY OF MUSCLE OF RIGHT HAND: Primary | ICD-10-CM

## 2018-05-08 DIAGNOSIS — M25.551 RIGHT HIP PAIN: ICD-10-CM

## 2018-05-08 PROBLEM — R10.11 RIGHT UPPER QUADRANT ABDOMINAL PAIN: Status: RESOLVED | Noted: 2018-03-27 | Resolved: 2018-05-08

## 2018-05-08 PROBLEM — Z90.49 HISTORY OF CHOLECYSTECTOMY: Status: ACTIVE | Noted: 2018-05-08

## 2018-05-08 PROCEDURE — 99213 OFFICE O/P EST LOW 20 MIN: CPT | Performed by: FAMILY MEDICINE

## 2018-05-08 ASSESSMENT — ENCOUNTER SYMPTOMS
NAUSEA: 0
SINUS PAIN: 0
STRIDOR: 0
WHEEZING: 0
HEARTBURN: 0
HEMOPTYSIS: 0
PHOTOPHOBIA: 0
BLOOD IN STOOL: 0
VOMITING: 0
ABDOMINAL PAIN: 0
SORE THROAT: 0
EYE REDNESS: 0
BLURRED VISION: 0
EYE DISCHARGE: 0
RESPIRATORY NEGATIVE: 1
COUGH: 0
EYES NEGATIVE: 1
ORTHOPNEA: 0
SPUTUM PRODUCTION: 0
BACK PAIN: 0
SHORTNESS OF BREATH: 0
DIARRHEA: 0
CONSTIPATION: 0
DOUBLE VISION: 0
EYE PAIN: 0
GASTROINTESTINAL NEGATIVE: 1

## 2018-05-08 ASSESSMENT — PATIENT HEALTH QUESTIONNAIRE - PHQ9
1. LITTLE INTEREST OR PLEASURE IN DOING THINGS: 1
SUM OF ALL RESPONSES TO PHQ QUESTIONS 1-9: 2
SUM OF ALL RESPONSES TO PHQ9 QUESTIONS 1 & 2: 2
2. FEELING DOWN, DEPRESSED OR HOPELESS: 1

## 2018-05-24 ENCOUNTER — OFFICE VISIT (OUTPATIENT)
Dept: PHYSICAL MEDICINE AND REHAB | Age: 48
End: 2018-05-24
Payer: COMMERCIAL

## 2018-05-24 VITALS — BODY MASS INDEX: 17.13 KG/M2 | HEIGHT: 68 IN | WEIGHT: 113 LBS

## 2018-05-24 DIAGNOSIS — G56.03 BILATERAL CARPAL TUNNEL SYNDROME: ICD-10-CM

## 2018-05-24 DIAGNOSIS — G56.23 ULNAR NEUROPATHY OF BOTH UPPER EXTREMITIES: ICD-10-CM

## 2018-05-24 PROCEDURE — 95886 MUSC TEST DONE W/N TEST COMP: CPT | Performed by: PHYSICAL MEDICINE & REHABILITATION

## 2018-05-24 PROCEDURE — 95913 NRV CNDJ TEST 13/> STUDIES: CPT | Performed by: PHYSICAL MEDICINE & REHABILITATION

## 2018-05-24 PROCEDURE — 99202 OFFICE O/P NEW SF 15 MIN: CPT | Performed by: PHYSICAL MEDICINE & REHABILITATION

## 2018-06-26 ENCOUNTER — OFFICE VISIT (OUTPATIENT)
Dept: ORTHOPEDIC SURGERY | Age: 48
End: 2018-06-26
Payer: COMMERCIAL

## 2018-06-26 VITALS
DIASTOLIC BLOOD PRESSURE: 79 MMHG | HEART RATE: 61 BPM | TEMPERATURE: 98.1 F | RESPIRATION RATE: 18 BRPM | SYSTOLIC BLOOD PRESSURE: 133 MMHG

## 2018-06-26 DIAGNOSIS — M62.541 ATROPHY OF MUSCLE OF RIGHT HAND: Primary | ICD-10-CM

## 2018-06-26 DIAGNOSIS — G56.01 CARPAL TUNNEL SYNDROME OF RIGHT WRIST: ICD-10-CM

## 2018-06-26 DIAGNOSIS — G56.21 ULNAR NEUROPATHY AT ELBOW OF RIGHT UPPER EXTREMITY: ICD-10-CM

## 2018-06-26 PROCEDURE — 99214 OFFICE O/P EST MOD 30 MIN: CPT | Performed by: ORTHOPAEDIC SURGERY

## 2018-06-27 RX ORDER — ERGOCALCIFEROL 1.25 MG/1
50000 CAPSULE ORAL WEEKLY
Qty: 4 CAPSULE | Refills: 3 | Status: SHIPPED | OUTPATIENT
Start: 2018-06-27 | End: 2018-07-25

## 2018-07-12 ENCOUNTER — PREP FOR PROCEDURE (OUTPATIENT)
Dept: ORTHOPEDIC SURGERY | Age: 48
End: 2018-07-12

## 2018-07-12 RX ORDER — SODIUM CHLORIDE 0.9 % (FLUSH) 0.9 %
10 SYRINGE (ML) INJECTION EVERY 12 HOURS SCHEDULED
Status: CANCELLED | OUTPATIENT
Start: 2018-07-12 | End: 2019-07-12

## 2018-07-12 RX ORDER — SODIUM CHLORIDE 9 MG/ML
INJECTION, SOLUTION INTRAVENOUS CONTINUOUS
Status: CANCELLED | OUTPATIENT
Start: 2018-07-12 | End: 2019-07-12

## 2018-07-12 RX ORDER — SODIUM CHLORIDE 0.9 % (FLUSH) 0.9 %
10 SYRINGE (ML) INJECTION PRN
Status: CANCELLED | OUTPATIENT
Start: 2018-07-12 | End: 2019-07-12

## 2018-07-25 NOTE — PROGRESS NOTES
[facility] PRE-ADMISSION TESTING INSTRUCTIONS    The Preadmission Testing patient is instructed accordingly using the following criteria (check applicable):    ARRIVAL INSTRUCTIONS:  [x] Parking the day of Surgery is located in the Main Entrance lot. Upon entering the door, make an immediate right to the surgery reception desk    [x] Complimentary [facility] Parking is available Monday through Friday 6 am to 6 pm    [x] Bring photo ID and insurance card    [] Bring in a copy of Living will or Durable Power of  papers. [x] Please be sure to arrange transportation to and from the hospital    [x] Please arrange for someone to be with you for the 24 hour period post procedure due to having anesthesia      GENERAL INSTRUCTIONS:    [x] Nothing by mouth after midnight, including gum, candy, mints or water    [x] You may brush your teeth, but do not swallow any water    [] Take medications as instructed with 1-2 oz of water    [] Stop herbal supplements and vitamins 5 days prior to procedure    [] Follow preop dosing of blood thinners per physician instructions    [] Take 1/2 dose of evening insulin, but no insulin after midnight    [] No oral diabetic medications after midnight    [] If diabetic and have low blood sugar or feel symptomatic, take 1-2oz apple juice only    [] Bring inhalers day of surgery    [] Bring C-PAP/ Bi-Pap day of surgery    [x] Bring urine specimen day of surgery    [x] Shower or bath with soap, lather and rinse well, AM of Surgery, no lotion, powders or creams     [] Follow bowel prep as instructed per surgeon    [] No tobacco products within 24 hours of surgery     [x] No alcohol or illegal drug use within 24 hours of surgery.     [x] Jewelry, body piercing's, eyeglasses, contact lenses and dentures are not permitted into surgery (bring cases)      [x] Please do not wear any nail polish, make up or hair products on the day of surgery    [x] If not already done, you

## 2018-08-07 NOTE — H&P
Department of Orthopedic Surgery  History and Physical        CHIEF COMPLAINT:  Right hand weakness     HISTORY OF PRESENT ILLNESS:                 The patient is a RHD 52 y.o. female who presents with right hand weakness. Patient states that over 5 years she has noticed right hand weakness with muscle wasting between her thumb and index finger webspace. She does have occasional numbness and tingling to all of her fingers. She states she had a nerve conduction study test and an EMG a year and half ago. Those results are not available to us. She does state that at that time they recommended surgical intervention. She did not pursue surgery at that time due to insurance reasons. She's had no treatments for this in the past. She has a family history of RA. She recently was tested and this was negative. She currently is unemployed.     Patient returns today follow-up EMG nerve nurses where she had completed. The review the results with her. She does have a decrease velocity across the elbow dropping down to 29 m/s. There is also increased 3+ fibrillations and sharp waves involving the 1st dorsal interosseous with decreased recruitment. In addition there is slowing across the carpal tunnel on the right side as well. Patient continues to complain of loss of function with cramping and nocturnal symptoms involving the upper extremity. She is interested in pursuing surgical options at this time.        Past Medical History:    Past Medical History             Diagnosis Date    Heart murmur       slight    Sinus drainage           Past Surgical History:    Past Surgical History       Procedure Laterality Date    DC LAP,CHOLECYSTECTOMY/GRAPH N/A 4/19/2018     CHOLECYSTECTOMY LAPAROSCOPIC WITH IOC performed by Boni Joyce MD at [address]         Current Medications:   Current Hospital Medications   No current facility-administered medications for this visit.       Allergies:  Patient has no known allergies.     Social positive Francisco's, Negative elbow flexion compression test, negative Finkelstein's, negative CMC grind, negative tenderness over the A1 pulleys. Positive wasting of the musculature in the 1st webspace of the hand. Positive clawing of the small finger. Native Wartenberg's. Median, ulnar, radial nerves intact light touch. Full flexion of the fingers. Gross motor 5 out of 5. Otherwise neurovascularly intact.     Left upper extremity: Positive Tinel's at the cubital tunnel, positive Tinel's of the carpal tunnel, positive Francisco's, negative elbow flexion compression test.     DATA:    CBC:         Lab Results   Component Value Date     WBC 7.5 04/16/2018     RBC 4.32 04/16/2018     HGB 13.8 04/16/2018     HCT 41.7 04/16/2018     MCV 96.5 04/16/2018     MCH 31.9 04/16/2018     MCHC 33.1 04/16/2018     RDW 13.4 04/16/2018      04/16/2018     MPV 9.5 04/16/2018      PT/INR:  No results found for: PROTIME, INR     Radiology Review:  X-rays of the right hand . 3 views: AP, lateral, oblique demonstrate no acute fractures or dislocations of the right hand. Soft tissues within normal limits. Joint space is well-preserved.        X-rays of the bilateral elbows were obtained today in the office. AP and lateral views of bilateral elbows are negative for any acute fracture dislocations. Soft tissues within normal limits. Impression of bilateral elbow x-rays: Negative for acute fractures or dislocations. 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 She is interested in pursuing surgery. Plan for right ulnar nerve decompression at elbow with anterior submuscular transposition given her ulnar nerve instability at elbow and body habitus. In addition we'll plan for carpal tunnel release distally. Postoperative expectations and healing were explained. She fully understands that she will not have any significant reversal of her hand atrophy. Surgery is designed to prevent further atrophy from occurring.     I explained the risks, benefits, alternatives and complications of surgery with the patient including but not limited to the risks of infection, possible damage to nerves, vessels, or tendons, stiffness, loss of range of motion, scar sensitivity, wound healing complications, worsening symptoms, possible need for therapy, as well as the possible need further surgery and unanticipated complications. The patient voiced understanding and all questions were answered. The patient elected to proceed with surgical intervention. History and Physical Update     Patient was seen and examined. Patient's history and physical was reviewed with the patient. There has been no significant interval changes.

## 2018-08-08 ENCOUNTER — ANESTHESIA EVENT (OUTPATIENT)
Dept: OPERATING ROOM | Age: 48
End: 2018-08-08
Payer: COMMERCIAL

## 2018-08-08 ENCOUNTER — ANESTHESIA (OUTPATIENT)
Dept: OPERATING ROOM | Age: 48
End: 2018-08-08
Payer: COMMERCIAL

## 2018-08-08 ENCOUNTER — HOSPITAL ENCOUNTER (OUTPATIENT)
Age: 48
Setting detail: OUTPATIENT SURGERY
Discharge: HOME | End: 2018-08-08
Attending: ORTHOPAEDIC SURGERY | Admitting: ORTHOPAEDIC SURGERY
Payer: COMMERCIAL

## 2018-08-08 VITALS
OXYGEN SATURATION: 100 % | TEMPERATURE: 95.9 F | SYSTOLIC BLOOD PRESSURE: 90 MMHG | RESPIRATION RATE: 1 BRPM | DIASTOLIC BLOOD PRESSURE: 49 MMHG

## 2018-08-08 VITALS
WEIGHT: 113 LBS | TEMPERATURE: 97.3 F | HEART RATE: 63 BPM | DIASTOLIC BLOOD PRESSURE: 69 MMHG | OXYGEN SATURATION: 99 % | BODY MASS INDEX: 17.13 KG/M2 | HEIGHT: 68 IN | SYSTOLIC BLOOD PRESSURE: 133 MMHG | RESPIRATION RATE: 16 BRPM

## 2018-08-08 DIAGNOSIS — G89.18 POST-OPERATIVE PAIN: Primary | ICD-10-CM

## 2018-08-08 LAB — HCG(URINE) PREGNANCY TEST: NEGATIVE

## 2018-08-08 PROCEDURE — 3600000012 HC SURGERY LEVEL 2 ADDTL 15MIN: Performed by: ORTHOPAEDIC SURGERY

## 2018-08-08 PROCEDURE — 2500000003 HC RX 250 WO HCPCS: Performed by: NURSE ANESTHETIST, CERTIFIED REGISTERED

## 2018-08-08 PROCEDURE — 2709999900 HC NON-CHARGEABLE SUPPLY: Performed by: ORTHOPAEDIC SURGERY

## 2018-08-08 PROCEDURE — 25280 REVISE WRIST/FOREARM TENDON: CPT | Performed by: ORTHOPAEDIC SURGERY

## 2018-08-08 PROCEDURE — 2500000003 HC RX 250 WO HCPCS: Performed by: ORTHOPAEDIC SURGERY

## 2018-08-08 PROCEDURE — A4565 SLINGS: HCPCS | Performed by: ORTHOPAEDIC SURGERY

## 2018-08-08 PROCEDURE — 2580000003 HC RX 258: Performed by: PHYSICIAN ASSISTANT

## 2018-08-08 PROCEDURE — 3700000001 HC ADD 15 MINUTES (ANESTHESIA): Performed by: ORTHOPAEDIC SURGERY

## 2018-08-08 PROCEDURE — 3700000000 HC ANESTHESIA ATTENDED CARE: Performed by: ORTHOPAEDIC SURGERY

## 2018-08-08 PROCEDURE — 3600000002 HC SURGERY LEVEL 2 BASE: Performed by: ORTHOPAEDIC SURGERY

## 2018-08-08 PROCEDURE — 6360000002 HC RX W HCPCS: Performed by: NURSE ANESTHETIST, CERTIFIED REGISTERED

## 2018-08-08 PROCEDURE — 81025 URINE PREGNANCY TEST: CPT

## 2018-08-08 PROCEDURE — 6360000002 HC RX W HCPCS: Performed by: PHYSICIAN ASSISTANT

## 2018-08-08 PROCEDURE — 7100000011 HC PHASE II RECOVERY - ADDTL 15 MIN: Performed by: ORTHOPAEDIC SURGERY

## 2018-08-08 PROCEDURE — 64721 CARPAL TUNNEL SURGERY: CPT | Performed by: ORTHOPAEDIC SURGERY

## 2018-08-08 PROCEDURE — 7100000000 HC PACU RECOVERY - FIRST 15 MIN: Performed by: ORTHOPAEDIC SURGERY

## 2018-08-08 PROCEDURE — 7100000010 HC PHASE II RECOVERY - FIRST 15 MIN: Performed by: ORTHOPAEDIC SURGERY

## 2018-08-08 PROCEDURE — 64718 REVISE ULNAR NERVE AT ELBOW: CPT | Performed by: ORTHOPAEDIC SURGERY

## 2018-08-08 PROCEDURE — 7100000001 HC PACU RECOVERY - ADDTL 15 MIN: Performed by: ORTHOPAEDIC SURGERY

## 2018-08-08 RX ORDER — ONDANSETRON 2 MG/ML
INJECTION INTRAMUSCULAR; INTRAVENOUS PRN
Status: DISCONTINUED | OUTPATIENT
Start: 2018-08-08 | End: 2018-08-08 | Stop reason: SDUPTHER

## 2018-08-08 RX ORDER — FENTANYL CITRATE 50 UG/ML
INJECTION, SOLUTION INTRAMUSCULAR; INTRAVENOUS PRN
Status: DISCONTINUED | OUTPATIENT
Start: 2018-08-08 | End: 2018-08-08 | Stop reason: SDUPTHER

## 2018-08-08 RX ORDER — PROPOFOL 10 MG/ML
INJECTION, EMULSION INTRAVENOUS PRN
Status: DISCONTINUED | OUTPATIENT
Start: 2018-08-08 | End: 2018-08-08 | Stop reason: SDUPTHER

## 2018-08-08 RX ORDER — LIDOCAINE HYDROCHLORIDE 20 MG/ML
INJECTION, SOLUTION EPIDURAL; INFILTRATION; INTRACAUDAL; PERINEURAL PRN
Status: DISCONTINUED | OUTPATIENT
Start: 2018-08-08 | End: 2018-08-08 | Stop reason: SDUPTHER

## 2018-08-08 RX ORDER — SODIUM CHLORIDE 9 MG/ML
INJECTION, SOLUTION INTRAVENOUS CONTINUOUS
Status: DISCONTINUED | OUTPATIENT
Start: 2018-08-08 | End: 2018-08-08 | Stop reason: HOSPADM

## 2018-08-08 RX ORDER — OXYCODONE HYDROCHLORIDE AND ACETAMINOPHEN 5; 325 MG/1; MG/1
1 TABLET ORAL EVERY 6 HOURS PRN
Qty: 28 TABLET | Refills: 0 | Status: SHIPPED | OUTPATIENT
Start: 2018-08-08 | End: 2018-08-15

## 2018-08-08 RX ORDER — SODIUM CHLORIDE 0.9 % (FLUSH) 0.9 %
10 SYRINGE (ML) INJECTION EVERY 12 HOURS SCHEDULED
Status: DISCONTINUED | OUTPATIENT
Start: 2018-08-08 | End: 2018-08-08 | Stop reason: HOSPADM

## 2018-08-08 RX ORDER — MIDAZOLAM HYDROCHLORIDE 1 MG/ML
INJECTION INTRAMUSCULAR; INTRAVENOUS PRN
Status: DISCONTINUED | OUTPATIENT
Start: 2018-08-08 | End: 2018-08-08 | Stop reason: SDUPTHER

## 2018-08-08 RX ORDER — SODIUM CHLORIDE 0.9 % (FLUSH) 0.9 %
10 SYRINGE (ML) INJECTION PRN
Status: DISCONTINUED | OUTPATIENT
Start: 2018-08-08 | End: 2018-08-08 | Stop reason: HOSPADM

## 2018-08-08 RX ORDER — DEXAMETHASONE SODIUM PHOSPHATE 4 MG/ML
INJECTION, SOLUTION INTRA-ARTICULAR; INTRALESIONAL; INTRAMUSCULAR; INTRAVENOUS; SOFT TISSUE PRN
Status: DISCONTINUED | OUTPATIENT
Start: 2018-08-08 | End: 2018-08-08 | Stop reason: SDUPTHER

## 2018-08-08 RX ORDER — BUPIVACAINE HYDROCHLORIDE AND EPINEPHRINE 2.5; 5 MG/ML; UG/ML
INJECTION, SOLUTION EPIDURAL; INFILTRATION; INTRACAUDAL; PERINEURAL PRN
Status: DISCONTINUED | OUTPATIENT
Start: 2018-08-08 | End: 2018-08-08 | Stop reason: HOSPADM

## 2018-08-08 RX ADMIN — FENTANYL CITRATE 50 MCG: 50 INJECTION, SOLUTION INTRAMUSCULAR; INTRAVENOUS at 07:24

## 2018-08-08 RX ADMIN — Medication 2 G: at 07:11

## 2018-08-08 RX ADMIN — DEXAMETHASONE SODIUM PHOSPHATE 8 MG: 4 INJECTION, SOLUTION INTRAMUSCULAR; INTRAVENOUS at 07:20

## 2018-08-08 RX ADMIN — FENTANYL CITRATE 50 MCG: 50 INJECTION, SOLUTION INTRAMUSCULAR; INTRAVENOUS at 07:45

## 2018-08-08 RX ADMIN — SODIUM CHLORIDE: 9 INJECTION, SOLUTION INTRAVENOUS at 07:11

## 2018-08-08 RX ADMIN — LIDOCAINE HYDROCHLORIDE 100 MG: 20 INJECTION, SOLUTION EPIDURAL; INFILTRATION; INTRACAUDAL; PERINEURAL at 07:13

## 2018-08-08 RX ADMIN — ONDANSETRON HYDROCHLORIDE 4 MG: 2 INJECTION, SOLUTION INTRAMUSCULAR; INTRAVENOUS at 07:20

## 2018-08-08 RX ADMIN — MIDAZOLAM HYDROCHLORIDE 2 MG: 1 INJECTION, SOLUTION INTRAMUSCULAR; INTRAVENOUS at 07:11

## 2018-08-08 RX ADMIN — PROPOFOL 200 MG: 10 INJECTION, EMULSION INTRAVENOUS at 07:13

## 2018-08-08 ASSESSMENT — PULMONARY FUNCTION TESTS
PIF_VALUE: 12
PIF_VALUE: 2
PIF_VALUE: 2
PIF_VALUE: 23
PIF_VALUE: 4
PIF_VALUE: 3
PIF_VALUE: 2
PIF_VALUE: 16
PIF_VALUE: 2
PIF_VALUE: 8
PIF_VALUE: 3
PIF_VALUE: 2
PIF_VALUE: 1
PIF_VALUE: 2
PIF_VALUE: 0
PIF_VALUE: 2
PIF_VALUE: 2
PIF_VALUE: 14
PIF_VALUE: 2
PIF_VALUE: 2
PIF_VALUE: 1
PIF_VALUE: 2
PIF_VALUE: 2
PIF_VALUE: 13
PIF_VALUE: 2
PIF_VALUE: 18
PIF_VALUE: 2
PIF_VALUE: 1
PIF_VALUE: 1
PIF_VALUE: 2
PIF_VALUE: 14
PIF_VALUE: 14
PIF_VALUE: 2

## 2018-08-08 ASSESSMENT — PAIN SCALES - GENERAL
PAINLEVEL_OUTOF10: 0

## 2018-08-08 ASSESSMENT — PAIN - FUNCTIONAL ASSESSMENT: PAIN_FUNCTIONAL_ASSESSMENT: 0-10

## 2018-08-08 ASSESSMENT — LIFESTYLE VARIABLES: SMOKING_STATUS: 0

## 2018-08-08 NOTE — OP NOTE
OPERATIVE REPORT       DATE OF PROCEDURE: 8/8/2018       PREOPERATIVE DIAGNOSES:  1.  right carpal tunnel syndrome. 2.  right ulnar neuropathy of elbow.     POSTOPERATIVE DIAGNOSES:  1.  right carpal tunnel syndrome. 2.  right ulnar neuropathy of elbow.     OPERATION PERFORMED:  1.  right carpal tunnel release. 2.  right ulnar nerve decompression at elbow with anterior submuscular  transposition. 3.  Flexor pronator lengthening with a Z-plasty.     SURGEON:  Barbara Cruz M.D.     ANESTHESIA:  1. General.  2.  Local anesthetic by surgeon consisting of approximately 20 mL of 0.25%  Marcaine with epinephrine.     ASSISTANT:  1. hSoaib Lawson, orthopedic surgery resident. 2.  Radha Sarmiento, physician assistant certified. She was present  throughout the procedure. Her assistance was used for preoperative  positioning, intraoperative retraction, closure, and dressing application. Her assistance expedited the case and decreased the surgical time.     TOTAL TOURNIQUET TIME:  Approximately 30 minutes at 250 mmHg of brachial  tourniquet.     FINDINGS:  1.  Evidence of median nerve compression beneath the transverse carpal  ligament that was adequately decompressed with release of the transverse  carpal ligament. Status post decompression, the median nerve was  identified throughout its visualized course and was fully decompressed from  the distal forearm fascia to its stratification distally. 2.  There was ulnar nerve compression through the cubital tunnel, that was  adequately decompressed with release of the cubital tunnel. Status post  decompression, the ulnar nerve subluxed greater than 50% over the medial  epicondyle. Therefore, an anterior submuscular transposition was  undertaken.   3.  Status post submuscular transposition and repair of the flexor pronator  musculature in a lengthened fashion, there was smooth excursion of the  ulnar nerve beneath the fascia with elbow flexion and extension.     COMPLICATIONS:  None.     DISPOSITION:  The patient remained stable throughout the procedure.     OPERATIVE INDICATIONS:  The patient presents with  persistent recalcitrant right upper extremity complaints. After failing  conservative management, he wished to proceed with surgical intervention. Risks, benefits, alternatives, and complications were fully outlined and  explained to him including, but not limited to the risks of infection;  damage to nerves, vessels, or tendons; failure to improve his symptoms;  worsening symptoms; recurrence of symptoms; pillar pain; thenar pain;  hypothenar pain; and scar sensitivity. The patient voiced understanding and wished  to proceed.     SURGERY IN DETAIL:  The patient was identified in the holding area. The  right upper extremity was identified as the surgical site. He was then  seen by Anesthesia, taken to the operating room, placed supine on the  table, underwent general anesthesia per Anesthesia Department. All bony  prominences were well padded. A well-padded arm tourniquet was placed. The extremity was prepared and draped in the standard sterile  fashion. Arm was exsanguinated. Tourniquet was inflated to 250 mmHg.     Attention was first directed towards the carpal tunnel release. An  incision in line with the radial border of the ring finger extending from  Edmondson's cardinal line to within 1 cm of the volar wrist flexor crease was  then created followed by blunt dissection and identification of the  superficial palmar fascia and incised longitudinally followed by blunt  dissection and identification of the distal margins of the transverse  carpal ligament as well as superficial palmar arch and underlying median  nerve.   Dissection was performed proximally identifying the contents of  Guyon's canal, which were reflected ulnarly and an incision was made in the  distal forearm fascia and extended distally dividing the fascia and  transverse

## 2018-08-08 NOTE — ANESTHESIA POSTPROCEDURE EVALUATION
Department of Anesthesiology  Postprocedure Note    Patient: Palma Mcallister  MRN: [id number]  YOB: 1970  Date of evaluation: 8/8/2018  Time:  8:19 AM     Procedure Summary     Date:  08/08/18 Room / Location:  [facility] OR  / [facility] OR    Anesthesia Start:  0711 Anesthesia Stop:  0815    Procedures:       RIGHT CARPAL TUNNEL RELEASE (Right Wrist)      RIGHT ULNAR NERVE DECOMPRESSION WITH SUB MUSCULAR  TRANSPOSITION (Right Elbow) Diagnosis:  (RIGHT CARPAL TUNNEL SYNDROME, RIGHT CUBITAL TUNNEL)    Surgeon:  Milla Altman MD Responsible Provider:  Rashad Martinez DO    Anesthesia Type:  MAC ASA Status:  2          Anesthesia Type: MAC    Melisa Phase I: Melisa Score: 10    Melisa Phase II:      Last vitals: Reviewed and per EMR flowsheets.        Anesthesia Post Evaluation    Patient location during evaluation: PACU  Patient participation: complete - patient participated  Level of consciousness: awake and alert  Pain score: 3  Airway patency: patent  Nausea & Vomiting: no nausea and no vomiting  Complications: no  Cardiovascular status: blood pressure returned to baseline and hemodynamically stable  Respiratory status: acceptable  Hydration status: euvolemic

## 2018-08-08 NOTE — ANESTHESIA PRE PROCEDURE
Department of Anesthesiology  Preprocedure Note       Name:  Brooklyn Bryan   Age:  52 y.o.  :  1970                                          MRN:  [id number]         Date:  2018      Surgeon: Molly Hill):  Ashlee Boston MD    Procedure: Procedure(s):  RIGHT CARPAL TUNNEL RELEASE  RIGHT ULNAR NERVE DECOMPRESSION WITH SUB MUSCULAR  TRANSPOSITION    Medications prior to admission:   Prior to Admission medications    Not on File       Current medications:    Current Facility-Administered Medications   Medication Dose Route Frequency Provider Last Rate Last Dose    0.9 % sodium chloride infusion   Intravenous Continuous TRINY Saldana        ceFAZolin (ANCEF) 2 g in dextrose 5 % 100 mL IVPB  2 g Intravenous On Call to [address] [place] [address]        sodium chloride flush 0.9 % injection 10 mL  10 mL Intravenous 2 times per day TRINY Saldana        sodium chloride flush 0.9 % injection 10 mL  10 mL Intravenous PRN TRINY Saldana           Allergies:  No Known Allergies    Problem List:    Patient Active Problem List   Diagnosis Code    Mixed hyperlipidemia E78.2    Fatigue R53.83    Right hand pain M79.641    Right hip pain M25.551    History of cholecystectomy Z90.49    Atrophy of muscle of right hand M62.541    Low vitamin D level R79.89       Past Medical History:        Diagnosis Date    Carpal tunnel syndrome, right     Heart murmur     slight / no cardiologist / no issues    Sinus drainage     chronic on and off    Ulnar nerve compression, right        Past Surgical History:        Procedure Laterality Date    AL LAP,CHOLECYSTECTOMY/GRAPH N/A 2018    CHOLECYSTECTOMY LAPAROSCOPIC WITH IOC performed by Lelo Marie MD at [address] History:    Social History   Substance Use Topics    Smoking status: Former Smoker     Quit date: 2002    Smokeless tobacco: Never Used      Comment: quit     Alcohol use Yes      Comment: occ Counseling given: Not Answered      Vital Signs (Current):   Vitals:    07/25/18 1133 08/08/18 0620   BP:  (!) 146/63   Pulse:  58   Resp:  16   Temp:  98.6 °F (37 °C)   TempSrc:  Temporal   SpO2:  100%   Weight: 113 lb (51.3 kg) 113 lb (51.3 kg)   Height: 5' 8\" (1.727 m) 5' 8\" (1.727 m)                                              BP Readings from Last 3 Encounters:   08/08/18 (!) 146/63   06/26/18 133/79   05/08/18 118/80       NPO Status: Time of last liquid consumption: 2300                        Time of last solid consumption: 1830                        Date of last liquid consumption: 08/07/18                        Date of last solid food consumption: 08/07/18    BMI:   Wt Readings from Last 3 Encounters:   08/08/18 113 lb (51.3 kg)   05/24/18 113 lb (51.3 kg)   05/08/18 109 lb 6.4 oz (49.6 kg)     Body mass index is 17.18 kg/m². CBC:   Lab Results   Component Value Date    WBC 7.5 04/16/2018    RBC 4.32 04/16/2018    HGB 13.8 04/16/2018    HCT 41.7 04/16/2018    MCV 96.5 04/16/2018    RDW 13.4 04/16/2018     04/16/2018       CMP:   Lab Results   Component Value Date     04/16/2018    K 3.8 04/16/2018    CL 97 04/16/2018    CO2 30 04/16/2018    BUN 8 04/16/2018    CREATININE 0.7 04/16/2018    GFRAA >60 04/16/2018    LABGLOM >60 04/16/2018    GLUCOSE 100 04/16/2018    PROT 8.8 04/16/2018    CALCIUM 9.4 04/16/2018    BILITOT 0.4 04/16/2018    ALKPHOS 73 04/16/2018    AST 19 04/16/2018    ALT 10 04/16/2018       POC Tests: No results for input(s): POCGLU, POCNA, POCK, POCCL, POCBUN, POCHEMO, POCHCT in the last 72 hours.     Coags: No results found for: PROTIME, INR, APTT    HCG (If Applicable):   Lab Results   Component Value Date    PREGTESTUR NEGATIVE 04/19/2018        ABGs: No results found for: PHART, PO2ART, ZBL2LYA, HEA2FCT, BEART, M9OZOYYH     Type & Screen (If Applicable):  No results found for: LABABO, [address]    Anesthesia Evaluation  Patient summary reviewed no history of anesthetic

## 2018-08-16 ENCOUNTER — OFFICE VISIT (OUTPATIENT)
Dept: ORTHOPEDIC SURGERY | Age: 48
End: 2018-08-16
Payer: COMMERCIAL

## 2018-08-16 VITALS
WEIGHT: 113 LBS | HEART RATE: 62 BPM | BODY MASS INDEX: 17.13 KG/M2 | TEMPERATURE: 97.9 F | SYSTOLIC BLOOD PRESSURE: 125 MMHG | RESPIRATION RATE: 16 BRPM | HEIGHT: 68 IN | DIASTOLIC BLOOD PRESSURE: 76 MMHG

## 2018-08-16 DIAGNOSIS — G56.23 ULNAR NEUROPATHY OF BOTH UPPER EXTREMITIES: Primary | ICD-10-CM

## 2018-08-16 PROCEDURE — 29075 APPL CST ELBW FNGR SHORT ARM: CPT | Performed by: ORTHOPAEDIC SURGERY

## 2018-08-16 PROCEDURE — 99024 POSTOP FOLLOW-UP VISIT: CPT | Performed by: ORTHOPAEDIC SURGERY

## 2018-08-16 NOTE — PROGRESS NOTES
A short arm cast was applied to Her Right arm. Neurovascular status was checked pre and post application. Patient was neurovascularly intact after the application process. The patient denied any issues with fit or comfort of the cast/splint. advised to avoid activities that put them at risk for falling. Patient instructed to call our office if there are any issues with the cast/splint.

## 2018-09-06 ENCOUNTER — OFFICE VISIT (OUTPATIENT)
Dept: ORTHOPEDIC SURGERY | Age: 48
End: 2018-09-06
Payer: COMMERCIAL

## 2018-09-06 VITALS
TEMPERATURE: 98.6 F | RESPIRATION RATE: 16 BRPM | BODY MASS INDEX: 17.13 KG/M2 | SYSTOLIC BLOOD PRESSURE: 119 MMHG | DIASTOLIC BLOOD PRESSURE: 76 MMHG | WEIGHT: 113 LBS | HEART RATE: 68 BPM | HEIGHT: 68 IN

## 2018-09-06 DIAGNOSIS — G56.01 CARPAL TUNNEL SYNDROME OF RIGHT WRIST: ICD-10-CM

## 2018-09-06 DIAGNOSIS — G56.21 ULNAR NEUROPATHY AT ELBOW OF RIGHT UPPER EXTREMITY: Primary | ICD-10-CM

## 2018-09-06 PROCEDURE — 99024 POSTOP FOLLOW-UP VISIT: CPT | Performed by: ORTHOPAEDIC SURGERY

## 2018-09-06 PROCEDURE — L3908 WHO COCK-UP NONMOLDE PRE OTS: HCPCS | Performed by: ORTHOPAEDIC SURGERY

## 2018-09-06 NOTE — PROGRESS NOTES
Six weeks out right submuscular ulnar nerve decompression at elbow and carpal tunnel release doing well. Her preoperative symptoms of larger resolved. She comes out of her cast at today's visit. Physical exam: Scars are maturing. Nontender over the surgical sites. Full elbow wrist and hand range of motion. Negative Wartenberg's and cross finger testing. Sensation intact thumb index middle ring and small finger to light touch. 2+ radial pulse. Otherwise neurovascular intact. Assessment: Six weeks out right submuscular ulnar nerve decompression at elbow and carpal tunnel release doing very well. Plan    Patient was fitted with a cockup wrist brace. She was referred to therapy. Follow-up after therapy is completed if needed.

## 2018-09-10 ENCOUNTER — EVALUATION (OUTPATIENT)
Dept: OCCUPATIONAL THERAPY | Age: 48
End: 2018-09-10
Payer: COMMERCIAL

## 2018-09-10 DIAGNOSIS — G56.01 CARPAL TUNNEL SYNDROME ON RIGHT: ICD-10-CM

## 2018-09-10 DIAGNOSIS — G56.21 CUBITAL TUNNEL SYNDROME ON RIGHT: Primary | ICD-10-CM

## 2018-09-10 PROCEDURE — 97165 OT EVAL LOW COMPLEX 30 MIN: CPT | Performed by: OCCUPATIONAL THERAPIST

## 2018-09-10 PROCEDURE — G8984 CARRY CURRENT STATUS: HCPCS | Performed by: OCCUPATIONAL THERAPIST

## 2018-09-10 PROCEDURE — 97110 THERAPEUTIC EXERCISES: CPT | Performed by: OCCUPATIONAL THERAPIST

## 2018-09-10 PROCEDURE — G8985 CARRY GOAL STATUS: HCPCS | Performed by: OCCUPATIONAL THERAPIST

## 2018-09-10 NOTE — PROGRESS NOTES
OCCUPATIONAL THERAPY INITIAL EVALUATION     Phone: [phone]  Fax: [phone]     Date:  9/10/2018                         Initial Evaluation Date: 9/10/2018    Patient Name:  Ezequiel Denny    :  1970    Restrictions/Precautions: Follow CTR and ulnar nerve sub-musc. 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 Insurance/Certification information:  [id number]  Referring Physician:  Dr. Monique Woodard  Date of Surgery/Injury: sx 2018  Plan of care signed (Y/N):  N  Visit# / total visits:     Past Medical History:   Past Medical History:   Diagnosis Date    Carpal tunnel syndrome, right     Heart murmur     slight / no cardiologist / no issues    Sinus drainage     chronic on and off    Ulnar nerve compression, right      Past Surgical History:   Past Surgical History:   Procedure Laterality Date    [place] LAP,CHOLECYSTECTOMY/GRAPH N/A 2018    CHOLECYSTECTOMY LAPAROSCOPIC WITH IOC performed by Sarmad Mcintosh MD at [address] [place] [address] N/CARPAL TUNNEL SURG Right 2018    RIGHT CARPAL TUNNEL RELEASE performed by Brian Reyes MD at [place] [address] Right 2018    RIGHT ULNAR NERVE DECOMPRESSION WITH SUB MUSCULAR  TRANSPOSITION performed by Brian Reyes MD at [address] [place] [address]       Reason for Referral:   Pt had been having issue with her UN for 2-3 years prior to sx. . She had CTR and submuscular transposition sx on 2018 ( 5 weeks ago). She presents today wearing her prefabricated wrist splint she got last week at her Dr's office visit. Home Living: Pt lives in a house with her 13 yr old daughter. Pt does the yard work - she has a riding mower. Prior Level of Function:  Indep    Pain Level:  Pt states she has not had much pain since her sx.   She has taken OTC for pain probably on 2 occasions only and has use ice a few times. Pain at rest is 0/10. Pain using her R arm with her brace on is 0-1/10. Pt hs not done resistive task with her R arm and brace however when she did by accident pain 6'ed to 4/10. Cognition:   Alert/Oriented x3     IADL History  Homemaking Responsibility:  Min. A.  .  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 She states she is doing most of her tasks with her L arm and the R to assist with the brace on. She cannot open jars, lift heavy pots and pans, she can lift a gallon container but not pour it. Shopping Responsibility: Min. A - to carry heavy bags. Mode of Transportation: Pt is driving. Work: Pt is currently not working. 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 ADL  Feeding:  Min. A   Grooming:  Indep -   Bathing:  Indep - showering. UE Dressing:  Indep  LE Dressing:  Indep  Toileting:  Indep  Transfer:  Indep    UE Assessment:  R UE has AROM and MG WNL's in her shoulder, forearm, elbow and digits. Right Wrist  AROM:    Wrist Flexion 0-80:    52*  Wrist Extension 0-70:   45*  Wrist Radial Deviation 0-20:   15*  Wrist Ulnar Deviation 0-45:   25*    Thumb Opposition:    Full. Pt has muscle wasting in her adductor muscle area of her R hand. She has +Fromonts sign with lateral pinching. Comment: Hand Dominance is Right. Sensation: WNL's    Circumferential Measurements:   Pt has slight edema in her R wrist and elbow. Circum. Lia. Wrist   R - 14.8 cm   L - 14.4 cm                              Elbow  R  - 23.2 cm    L - 21.8 cm    Dynamometer (setting 2):     Left: NT      Right: NT      Pinch Meter:   Lateral: Left= NT, Right= NT    Palmar 3 point: Left= NT,     Right= NT    Intervention: Pt was given a HEP - see attached. This include wrist AROM for tenodesis - No pain. She was also shown Median nerve glide to do 5 rep's 2 times a day.   Edema control techniques

## 2018-09-13 ENCOUNTER — TREATMENT (OUTPATIENT)
Dept: OCCUPATIONAL THERAPY | Age: 48
End: 2018-09-13
Payer: COMMERCIAL

## 2018-09-13 DIAGNOSIS — G56.01 CARPAL TUNNEL SYNDROME ON RIGHT: ICD-10-CM

## 2018-09-13 DIAGNOSIS — G56.21 CUBITAL TUNNEL SYNDROME ON RIGHT: Primary | ICD-10-CM

## 2018-09-13 PROCEDURE — 97140 MANUAL THERAPY 1/> REGIONS: CPT | Performed by: OCCUPATIONAL THERAPIST

## 2018-09-13 PROCEDURE — 97022 WHIRLPOOL THERAPY: CPT | Performed by: OCCUPATIONAL THERAPIST

## 2018-09-13 PROCEDURE — 97110 THERAPEUTIC EXERCISES: CPT | Performed by: OCCUPATIONAL THERAPIST

## 2018-09-13 NOTE — PROGRESS NOTES
OCCUPATIONAL THERAPY PROGRESS NOTE    Date:  2018                         Initial Evaluation Date: 9/10/2018    Patient Name:  Palma Mcallister    :  1970    Restrictions/Precautions: Follow CTR and ulnar nerve sub-musc. 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 Insurance/Certification information:  [id number]  Referring Physician:  Dr. Carol Francis  Date of Surgery/Injury: sx 2018  Plan of care signed (Y/N):  N  Visit# / total visits:     Pain Level:   Low level with light activity 3/10    Subjective: \"I wear that elbow sleeve part of the day - after a while it bothers me but I do wear it while I'm driving. \"     Objective:  Updated POC to be completed by 10/10/2018. INTERVENTION: COMPLETED: SPECIFICS/COMMENTS:   Modality:     fluiotherapy X 12 min. For soft tissue warm-up        AROM:               AAROM:     Ball stretches X 10- rep's ea Forward elbow flex/ext, arms extended forearm rotation, wrist flex/ext        PROM/Stretching:     Passive wrist/ext and thumb X - R While doing CT scar massage   PROM elbow and wrist x All planes. More time spent on wrist ext stretch today. Ball table top roll X 10 rep's For wrist flex. And ext stretch - hold 10 sec. Scar Mass/Edema Control:     Scar massage  x Both areas. Strengthening:               Other:     Median nerve glide X- 5 rep's Doing at home also          Assessment/Comments: Pt is making Good progress toward stated plan of care.  Pt states she feels more of a stiffness in her elbow when moving it then pain and pain with end range wrist extension.  -Rehab Potential: Good  -Requires OT Follow Up: Yes  Time In: 11:00 am           Time Out:  11:50 am          Visit #: 2    Treatment Charges: Mins Units   Modalities/fluio 12 1   Ther Exercise 20 1   Manual Therapy 18 1

## 2018-09-20 ENCOUNTER — TREATMENT (OUTPATIENT)
Dept: OCCUPATIONAL THERAPY | Age: 48
End: 2018-09-20
Payer: COMMERCIAL

## 2018-09-20 DIAGNOSIS — G56.01 CARPAL TUNNEL SYNDROME ON RIGHT: ICD-10-CM

## 2018-09-20 DIAGNOSIS — G56.21 CUBITAL TUNNEL SYNDROME ON RIGHT: Primary | ICD-10-CM

## 2018-09-20 PROCEDURE — 97022 WHIRLPOOL THERAPY: CPT | Performed by: OCCUPATIONAL THERAPIST

## 2018-09-20 PROCEDURE — 97110 THERAPEUTIC EXERCISES: CPT | Performed by: OCCUPATIONAL THERAPIST

## 2018-09-20 PROCEDURE — 97140 MANUAL THERAPY 1/> REGIONS: CPT | Performed by: OCCUPATIONAL THERAPIST

## 2018-09-20 NOTE — PROGRESS NOTES
OCCUPATIONAL THERAPY PROGRESS NOTE    Date:  2018                         Initial Evaluation Date: 9/10/2018    Patient Name:  Severa Grounds    :  1970    Restrictions/Precautions: Follow CTR and ulnar nerve sub-musc. 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 Insurance/Certification information:  [address]  Referring Physician:  Dr. Skyler Siddiqui  Date of Surgery/Injury: sx 2018  Plan of care signed (Y/N):  N  Visit# / total visits:     Pain Level: No pain complaints. Subjective: \"I am doing pretty good. \"  Objective:  Updated POC to be completed by 10/10/2018. INTERVENTION: COMPLETED: SPECIFICS/COMMENTS:   Modality:     fluiotherapy X 10 min. For soft tissue warm-up        AROM:               AAROM:     Ball stretches  Forward elbow flex/ext, arms extended forearm rotation, wrist flex/ext        PROM/Stretching:     Passive wrist/ext and thumb X - R While doing CT scar massage   PROM elbow and wrist x All planes. More time spent on wrist ext stretch today. Prayer stretch x    Ball table top roll X 10 rep's For wrist flex. And ext stretch - hold 10 sec. Scar Mass/Edema Control:     Scar massage  x Both areas. Strengthening:     Putty ex- soft x Gripping x 20/ roll and pinch x 2   Wrist PREs 1# 1-20 each Wrist extension/ deviations   Other:     Median nerve glide X- 5 rep's Doing at home also          Assessment/Comments: Pt is making Good progress toward stated plan of care.  Pt states she feels more of a stiffness in her elbow when moving it then pain and pain with end range wrist extension.  -Rehab Potential: Good  -Requires OT Follow Up: Yes  Time In: 11:05 am           Time Out:  12:00 am          Visit #: 4    Treatment Charges: Mins Units   Modalities/fluio 10 1   Ther Exercise 35 2   Manual Therapy 10 1   Thera Activities     ADL/Home Mgt      Neuro Re-education     Gait Training     Group Therapy     Non-Billable Service Time     Other     Total Time/Units 55 4     -Response to Treatment: Light strengthening tolerated well today with complaints of minor fatigue vs pain. Will monitor response to today's session and adjust as tolerated. Goals: Goals for pt can be see on initial eval occurring on 9/10/2018    Plan:   [x]  Continues Plan of care:   Continue with goals as outlined in the IE. Kimberly [facility] Treatment covered based on POC and graduated to patient's progress. Pt education continues at each visit to obtain maximum benefits from skilled OT intervention.   []  [facility] of care:   []  Discharge:      Jesus Alberto Joya OT/L, [id number]

## 2018-09-24 ENCOUNTER — TREATMENT (OUTPATIENT)
Dept: OCCUPATIONAL THERAPY | Age: 48
End: 2018-09-24
Payer: COMMERCIAL

## 2018-09-24 DIAGNOSIS — G56.01 CARPAL TUNNEL SYNDROME ON RIGHT: ICD-10-CM

## 2018-09-24 DIAGNOSIS — G56.21 CUBITAL TUNNEL SYNDROME ON RIGHT: Primary | ICD-10-CM

## 2018-09-24 PROCEDURE — 97110 THERAPEUTIC EXERCISES: CPT | Performed by: OCCUPATIONAL THERAPIST

## 2018-09-24 PROCEDURE — 97140 MANUAL THERAPY 1/> REGIONS: CPT | Performed by: OCCUPATIONAL THERAPIST

## 2018-09-24 NOTE — PROGRESS NOTES
have improved on this date.   -Rehab Potential: Good  -Requires OT Follow Up: Yes  Time In: 11:05 am           Time Out:  12:00 am          Visit #: 5    Treatment Charges: Mins Units   Modalities/fluio 10    Ther Exercise 30 2   Manual Therapy 15 1   Thera Activities     ADL/Home Mgt      Neuro Re-education     Gait Training     Group Therapy     Non-Billable Service Time     Other     Total Time/Units 55 3     -Response to Treatment: Light strengthening tolerated well today with complaints of minor fatigue vs pain. Will monitor response to today's session and adjust as tolerated. Goals: Goals for pt can be see on initial eval occurring on 9/10/2018    Plan:   [x]  Continues Plan of care:   Continue with goals as outlined in the IE. Kayla Holm Treatment covered based on POC and graduated to patient's progress. Pt education continues at each visit to obtain maximum benefits from skilled OT intervention.   []  Alter Plan of care:   []  Discharge:      Sheri Herbert OT/L,CHT

## 2018-09-27 ENCOUNTER — TREATMENT (OUTPATIENT)
Dept: OCCUPATIONAL THERAPY | Age: 48
End: 2018-09-27
Payer: COMMERCIAL

## 2018-09-27 DIAGNOSIS — G56.01 CARPAL TUNNEL SYNDROME ON RIGHT: ICD-10-CM

## 2018-09-27 DIAGNOSIS — G56.21 CUBITAL TUNNEL SYNDROME ON RIGHT: Primary | ICD-10-CM

## 2018-09-27 PROCEDURE — 97140 MANUAL THERAPY 1/> REGIONS: CPT | Performed by: OCCUPATIONAL THERAPIST

## 2018-09-27 PROCEDURE — 97110 THERAPEUTIC EXERCISES: CPT | Performed by: OCCUPATIONAL THERAPIST

## 2018-09-27 PROCEDURE — 97018 PARAFFIN BATH THERAPY: CPT | Performed by: OCCUPATIONAL THERAPIST

## 2018-09-27 NOTE — PROGRESS NOTES
OCCUPATIONAL THERAPY PROGRESS NOTE    Date:  2018                         Initial Evaluation Date: 9/10/2018    Patient Name:  Brody James    :  1970    Restrictions/Precautions: Follow CTR and ulnar nerve sub-musc. 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 Insurance/Certification information:  [id number]  Referring Physician:  Dr. Shelton Souza  Date of Surgery/Injury: sx 2018  Plan of care signed (Y/N):  N  Visit# / total visits:      Pain Level: low level pain in wrist flexion today -3/10. Subjective: \"My wrist is sore today - bending down - I think from how I slept on it. \"  Objective:  Updated POC to be completed by 10/10/2018. INTERVENTION: COMPLETED: SPECIFICS/COMMENTS:   Modality:     fluiotherapy  10 min. For soft tissue warm-up   Paraffin dip X R hand  End of session to decrease pain wrist flex. pain   MHP  X 10 min. To R CT and elbow area. AROM:               AAROM:     Ball stretches  Forward elbow flex/ext, arms extended forearm rotation, wrist flex/ext   UBE  X 6 min. 3 each directions    PROM/Stretching:     Passive wrist/ext and thumb X - R While doing CT scar massage   PROM elbow and wrist x All planes. More time spent on wrist ext stretch today. Prayer stretch     Ball table top roll 10 rep's For wrist flex. And ext stretch - hold 10 sec. Scar Mass/Edema Control:     Scar massage  x Both areas. Strengthening:     Green flex bar X 10 rep's ea Twist and bend down   Red thera tube X 15 rep's 3 exer - high row, middle row and forward chest press   Elbow 2# wt x Elbow flex and ext.  - 20 rep's each   Putty ex- soft  Gripping x 20/ roll/ and taffy pull   Wrist PREs X-1# 1-20 each Wrist extension/ deviations   Other:     Median nerve glide - 5 rep's Doing at home also Assessment/Comments: Pt is making Good progress toward stated plan of care. Pt had increased wrist flexion pain today at end range - she states her pain did not increase during the exercises - therapist did do paraffin end of session.    -Rehab Potential: Good  -Requires OT Follow Up: Yes  Time In:   9:30 am           Time Out:  10:30 am          Visit #: 5    Treatment Charges: Mins Units   Modalities/MHP/parrafin 20 1   Ther Exercise 25 2   Manual Therapy 15 1   Thera Activities     ADL/Home Mgt      Neuro Re-education     Gait Training     Group Therapy     Non-Billable Service Time     Other     Total Time/Units 60 4     -Response to Treatment: Light strengthening tolerated well today with complaints of minor fatigue vs pain. Will monitor response to today's session and adjust as tolerated. Goals: Goals for pt can be see on initial eval occurring on 9/10/2018    Plan:   [x]  Continues Plan of care:   Continue with goals as outlined in the IE. [facility] Treatment covered based on POC and graduated to patient's progress. Pt education continues at each visit to obtain maximum benefits from skilled OT intervention.   []  Alter Plan of care:   []  Discharge:      Jerardo Lopez OT/L,CHT

## 2018-10-01 ENCOUNTER — TREATMENT (OUTPATIENT)
Dept: OCCUPATIONAL THERAPY | Age: 48
End: 2018-10-01
Payer: COMMERCIAL

## 2018-10-01 DIAGNOSIS — G56.21 CUBITAL TUNNEL SYNDROME ON RIGHT: Primary | ICD-10-CM

## 2018-10-01 DIAGNOSIS — G56.01 CARPAL TUNNEL SYNDROME ON RIGHT: ICD-10-CM

## 2018-10-01 PROCEDURE — 97140 MANUAL THERAPY 1/> REGIONS: CPT | Performed by: OCCUPATIONAL THERAPIST

## 2018-10-01 PROCEDURE — 97110 THERAPEUTIC EXERCISES: CPT | Performed by: OCCUPATIONAL THERAPIST

## 2018-10-01 PROCEDURE — 97022 WHIRLPOOL THERAPY: CPT | Performed by: OCCUPATIONAL THERAPIST

## 2018-10-01 NOTE — PROGRESS NOTES
OCCUPATIONAL THERAPY PROGRESS NOTE    Date:  10/1/2018                         Initial Evaluation Date: 9/10/2018    Patient Name:  Michelle Tejada    :  1970    Restrictions/Precautions: Follow CTR and ulnar nerve sub-musc. 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 Insurance/Certification information:  [address]  Referring Physician:  Dr. Brandon Chavez  Date of Surgery/Injury: sx 2018  Plan of care signed (Y/N):  N  Visit# / total visits:       Pain Level: low level pain in wrist flexion today -2-3/10. Subjective: \"I'm using my R arm to load the logs for my wood burner - it didn't hurt but my muscle does get fatigued. Especially here ( flexor muscle mass). I was also able to unlock my back door with my R hand alone today. \"  Objective:  Updated POC to be completed by 10/10/2018. INTERVENTION: COMPLETED: SPECIFICS/COMMENTS:   Modality:     fluiotherapy X - 10 min. For soft tissue warm-up while intermittently moving her wrist and digits. Paraffin dip  R hand  End of session to decrease pain wrist flex. pain   MHP   10 min. To R CT and elbow area. AROM:               AAROM:     Ball stretches  Forward elbow flex/ext, arms extended forearm rotation, wrist flex/ext   UBE  X 7 min. 3.5  each directions    PROM/Stretching:     Passive wrist/ext and thumb X - R While doing CT scar massage   PROM elbow and wrist x All planes. More time spent on wrist ext stretch today. Prayer stretch     Ball table top roll 10 rep's For wrist flex. And ext stretch - hold 10 sec. Scar Mass/Edema Control:     Scar massage  x Both areas.         Strengthening:     Green flex bar X 20 rep's ea ^ from 15 Twist and bend down and added hold L bend forward with R   Green thera tube ^ from red X 15 rep's 4 exer - high row, middle row and forward

## 2018-10-04 ENCOUNTER — TREATMENT (OUTPATIENT)
Dept: OCCUPATIONAL THERAPY | Age: 48
End: 2018-10-04
Payer: COMMERCIAL

## 2018-10-04 DIAGNOSIS — G56.21 CUBITAL TUNNEL SYNDROME ON RIGHT: Primary | ICD-10-CM

## 2018-10-04 DIAGNOSIS — G56.01 CARPAL TUNNEL SYNDROME ON RIGHT: ICD-10-CM

## 2018-10-04 PROCEDURE — 97018 PARAFFIN BATH THERAPY: CPT | Performed by: OCCUPATIONAL THERAPIST

## 2018-10-04 PROCEDURE — 97140 MANUAL THERAPY 1/> REGIONS: CPT | Performed by: OCCUPATIONAL THERAPIST

## 2018-10-04 PROCEDURE — 97110 THERAPEUTIC EXERCISES: CPT | Performed by: OCCUPATIONAL THERAPIST

## 2018-10-08 ENCOUNTER — TREATMENT (OUTPATIENT)
Dept: OCCUPATIONAL THERAPY | Age: 48
End: 2018-10-08
Payer: COMMERCIAL

## 2018-10-08 DIAGNOSIS — G56.21 CUBITAL TUNNEL SYNDROME ON RIGHT: Primary | ICD-10-CM

## 2018-10-08 DIAGNOSIS — G56.01 CARPAL TUNNEL SYNDROME ON RIGHT: ICD-10-CM

## 2018-10-08 PROCEDURE — 97018 PARAFFIN BATH THERAPY: CPT | Performed by: OCCUPATIONAL THERAPIST

## 2018-10-08 PROCEDURE — 97110 THERAPEUTIC EXERCISES: CPT | Performed by: OCCUPATIONAL THERAPIST

## 2018-10-08 PROCEDURE — 97140 MANUAL THERAPY 1/> REGIONS: CPT | Performed by: OCCUPATIONAL THERAPIST

## 2018-10-08 NOTE — PROGRESS NOTES
soft x Gripping x 20/ roll Did palm press today 15 rep's   Wrist PREs ^ to 2# X-2# 1-15 each Wrist extension/ deviations/ flexion and forearm rotation. Other:     Median nerve glide - 5 rep's Doing at home also          Assessment/Comments: Pt is making Good progress toward stated plan of care. -Rehab Potential: Good  -Requires OT Follow Up: Yes  Time In:   10:05 am           Time Out:  11:00 am          Visit #: 8    Treatment Charges: Mins Units   Modalities  Paraffin/ US 18 1   Ther Exercise 27 2   Manual Therapy 10 1   Thera Activities     ADL/Home Mgt      Neuro Re-education     Gait Training     Group Therapy     Non-Billable Service Time     Other     Total Time/Units 55 4     -Response to Treatment: Good steady progress continues. Increased muscle fatigue reported after exercises. Recovery is good with short rests. Pt states she is using her arm more at home with improving tolerance. Goals: Goals for pt can be seen on initial eval occurring on 9/10/2018    Plan:   [x]  Continue Plan of care:   Continue with goals as outlined in the IE. [facility] Treatment covered based on POC and graduated to patient's progress. Pt education continues at each visit to obtain maximum benefits from skilled OT intervention.   []  [address] of care:   []  Discharge:      Rc Childs OT/L, [id number]

## 2018-10-10 ENCOUNTER — OFFICE VISIT (OUTPATIENT)
Dept: FAMILY MEDICINE CLINIC | Age: 48
End: 2018-10-10
Payer: COMMERCIAL

## 2018-10-10 ENCOUNTER — HOSPITAL ENCOUNTER (OUTPATIENT)
Age: 48
Discharge: HOME | End: 2018-10-12
Payer: COMMERCIAL

## 2018-10-10 VITALS
DIASTOLIC BLOOD PRESSURE: 80 MMHG | WEIGHT: 114 LBS | OXYGEN SATURATION: 98 % | HEART RATE: 65 BPM | SYSTOLIC BLOOD PRESSURE: 118 MMHG | HEIGHT: 68 IN | BODY MASS INDEX: 17.28 KG/M2 | RESPIRATION RATE: 18 BRPM

## 2018-10-10 DIAGNOSIS — E78.2 MIXED HYPERLIPIDEMIA: ICD-10-CM

## 2018-10-10 DIAGNOSIS — Z23 IMMUNIZATION DUE: ICD-10-CM

## 2018-10-10 DIAGNOSIS — R53.83 FATIGUE, UNSPECIFIED TYPE: ICD-10-CM

## 2018-10-10 DIAGNOSIS — R73.01 IFG (IMPAIRED FASTING GLUCOSE): ICD-10-CM

## 2018-10-10 DIAGNOSIS — R79.89 LOW VITAMIN D LEVEL: ICD-10-CM

## 2018-10-10 DIAGNOSIS — N93.8 DUB (DYSFUNCTIONAL UTERINE BLEEDING): Primary | ICD-10-CM

## 2018-10-10 LAB
ALBUMIN SERPL-MCNC: 4.4 G/DL (ref 3.5–5.2)
ALP BLD-CCNC: 36 U/L (ref 35–104)
ALT SERPL-CCNC: 7 U/L (ref 0–32)
ANION GAP SERPL CALCULATED.3IONS-SCNC: 13 MMOL/L (ref 7–16)
AST SERPL-CCNC: 18 U/L (ref 0–31)
BASOPHILS ABSOLUTE: 0.07 E9/L (ref 0–0.2)
BASOPHILS RELATIVE PERCENT: 1.9 % (ref 0–2)
BILIRUB SERPL-MCNC: 0.4 MG/DL (ref 0–1.2)
BUN BLDV-MCNC: 12 MG/DL (ref 6–20)
CALCIUM SERPL-MCNC: 9.2 MG/DL (ref 8.6–10.2)
CHLORIDE BLD-SCNC: 98 MMOL/L (ref 98–107)
CHOLESTEROL, TOTAL: 180 MG/DL (ref 0–199)
CO2: 27 MMOL/L (ref 22–29)
CREAT SERPL-MCNC: 0.9 MG/DL (ref 0.5–1)
EOSINOPHILS ABSOLUTE: 0.2 E9/L (ref 0.05–0.5)
EOSINOPHILS RELATIVE PERCENT: 5.5 % (ref 0–6)
GFR AFRICAN AMERICAN: >60
GFR NON-AFRICAN AMERICAN: >60 ML/MIN/1.73
GLUCOSE BLD-MCNC: 97 MG/DL (ref 74–109)
HBA1C MFR BLD: 5.4 % (ref 4–5.6)
HCG QUALITATIVE: NEGATIVE
HCT VFR BLD CALC: 40.2 % (ref 34–48)
HDLC SERPL-MCNC: 90 MG/DL
HEMOGLOBIN: 12.4 G/DL (ref 11.5–15.5)
IMMATURE GRANULOCYTES #: 0.01 E9/L
IMMATURE GRANULOCYTES %: 0.3 % (ref 0–5)
LDL CHOLESTEROL CALCULATED: 79 MG/DL (ref 0–99)
LYMPHOCYTES ABSOLUTE: 0.91 E9/L (ref 1.5–4)
LYMPHOCYTES RELATIVE PERCENT: 25.2 % (ref 20–42)
MCH RBC QN AUTO: 30.6 PG (ref 26–35)
MCHC RBC AUTO-ENTMCNC: 30.8 % (ref 32–34.5)
MCV RBC AUTO: 99.3 FL (ref 80–99.9)
MONOCYTES ABSOLUTE: 0.33 E9/L (ref 0.1–0.95)
MONOCYTES RELATIVE PERCENT: 9.1 % (ref 2–12)
NEUTROPHILS ABSOLUTE: 2.09 E9/L (ref 1.8–7.3)
NEUTROPHILS RELATIVE PERCENT: 58 % (ref 43–80)
PDW BLD-RTO: 12.9 FL (ref 11.5–15)
PLATELET # BLD: 302 E9/L (ref 130–450)
PMV BLD AUTO: 10.3 FL (ref 7–12)
POTASSIUM SERPL-SCNC: 4.7 MMOL/L (ref 3.5–5)
RBC # BLD: 4.05 E12/L (ref 3.5–5.5)
SODIUM BLD-SCNC: 138 MMOL/L (ref 132–146)
TOTAL PROTEIN: 7.6 G/DL (ref 6.4–8.3)
TRIGL SERPL-MCNC: 55 MG/DL (ref 0–149)
TSH SERPL DL<=0.05 MIU/L-ACNC: 2.39 UIU/ML (ref 0.27–4.2)
VITAMIN D 25-HYDROXY: 23 NG/ML (ref 30–100)
VLDLC SERPL CALC-MCNC: 11 MG/DL
WBC # BLD: 3.6 E9/L (ref 4.5–11.5)

## 2018-10-10 PROCEDURE — 90471 IMMUNIZATION ADMIN: CPT | Performed by: FAMILY MEDICINE

## 2018-10-10 PROCEDURE — 85025 COMPLETE CBC W/AUTO DIFF WBC: CPT

## 2018-10-10 PROCEDURE — 90472 IMMUNIZATION ADMIN EACH ADD: CPT | Performed by: FAMILY MEDICINE

## 2018-10-10 PROCEDURE — 80053 COMPREHEN METABOLIC PANEL: CPT

## 2018-10-10 PROCEDURE — 80061 LIPID PANEL: CPT

## 2018-10-10 PROCEDURE — 99214 OFFICE O/P EST MOD 30 MIN: CPT | Performed by: FAMILY MEDICINE

## 2018-10-10 PROCEDURE — 83036 HEMOGLOBIN GLYCOSYLATED A1C: CPT

## 2018-10-10 PROCEDURE — 90686 IIV4 VACC NO PRSV 0.5 ML IM: CPT | Performed by: FAMILY MEDICINE

## 2018-10-10 PROCEDURE — 84443 ASSAY THYROID STIM HORMONE: CPT

## 2018-10-10 PROCEDURE — 90715 TDAP VACCINE 7 YRS/> IM: CPT | Performed by: FAMILY MEDICINE

## 2018-10-10 PROCEDURE — 84703 CHORIONIC GONADOTROPIN ASSAY: CPT

## 2018-10-10 PROCEDURE — 82306 VITAMIN D 25 HYDROXY: CPT

## 2018-10-11 ENCOUNTER — OFFICE VISIT (OUTPATIENT)
Dept: ORTHOPEDIC SURGERY | Age: 48
End: 2018-10-11

## 2018-10-11 VITALS
HEART RATE: 74 BPM | WEIGHT: 114 LBS | TEMPERATURE: 98 F | SYSTOLIC BLOOD PRESSURE: 134 MMHG | DIASTOLIC BLOOD PRESSURE: 74 MMHG | HEIGHT: 68 IN | RESPIRATION RATE: 20 BRPM | BODY MASS INDEX: 17.28 KG/M2

## 2018-10-11 DIAGNOSIS — G56.01 CARPAL TUNNEL SYNDROME OF RIGHT WRIST: ICD-10-CM

## 2018-10-11 DIAGNOSIS — G56.21 ULNAR NEUROPATHY AT ELBOW OF RIGHT UPPER EXTREMITY: Primary | ICD-10-CM

## 2018-10-11 PROCEDURE — 99024 POSTOP FOLLOW-UP VISIT: CPT | Performed by: ORTHOPAEDIC SURGERY

## 2018-10-14 PROBLEM — N93.8 DUB (DYSFUNCTIONAL UTERINE BLEEDING): Status: ACTIVE | Noted: 2018-10-14

## 2018-10-15 ASSESSMENT — ENCOUNTER SYMPTOMS
EYE PAIN: 0
ORTHOPNEA: 0
RESPIRATORY NEGATIVE: 1
HEARTBURN: 0
SPUTUM PRODUCTION: 0
BLOOD IN STOOL: 0
HEMOPTYSIS: 0
BACK PAIN: 0
ABDOMINAL PAIN: 0
SHORTNESS OF BREATH: 0
STRIDOR: 0
EYES NEGATIVE: 1
VOMITING: 0
CONSTIPATION: 0
EYE DISCHARGE: 0
SORE THROAT: 0
GASTROINTESTINAL NEGATIVE: 1
DOUBLE VISION: 0
EYE REDNESS: 0
WHEEZING: 0
DIARRHEA: 0
COUGH: 0
PHOTOPHOBIA: 0
NAUSEA: 0
SINUS PAIN: 0
BLURRED VISION: 0

## 2018-10-18 ENCOUNTER — TREATMENT (OUTPATIENT)
Dept: OCCUPATIONAL THERAPY | Age: 48
End: 2018-10-18
Payer: COMMERCIAL

## 2018-10-18 DIAGNOSIS — G56.01 CARPAL TUNNEL SYNDROME ON RIGHT: ICD-10-CM

## 2018-10-18 DIAGNOSIS — G56.21 CUBITAL TUNNEL SYNDROME ON RIGHT: Primary | ICD-10-CM

## 2018-10-18 PROCEDURE — 97110 THERAPEUTIC EXERCISES: CPT | Performed by: OCCUPATIONAL THERAPIST

## 2018-10-18 PROCEDURE — G8986 CARRY D/C STATUS: HCPCS | Performed by: OCCUPATIONAL THERAPIST

## 2018-10-18 PROCEDURE — 97140 MANUAL THERAPY 1/> REGIONS: CPT | Performed by: OCCUPATIONAL THERAPIST

## 2018-10-18 PROCEDURE — G8985 CARRY GOAL STATUS: HCPCS | Performed by: OCCUPATIONAL THERAPIST

## 2018-10-18 PROCEDURE — 97022 WHIRLPOOL THERAPY: CPT | Performed by: OCCUPATIONAL THERAPIST

## 2018-10-18 NOTE — PROGRESS NOTES
Modalities  Paraffin/ US 15 1   Ther Exercise 30 2   Manual Therapy 15 1   Thera Activities     ADL/Home Mgt      Neuro Re-education     Gait Training     Group Therapy     Non-Billable Service Time     Other     Total Time/Units 60 4     -Response to Treatment: Good steady progress continues. Increased muscle fatigue reported less frequently now. Pt states she is using her arm more at home with improving tolerance. Goals: Goals for pt can be seen on initial eval occurring on 9/10/2018 and her Discharge on this date. Treatment did include:   [x] Instruction in HEP                   Modalities:  [x] Therapeutic Exercise [x] Ultrasound      [] Electrical Stimulation/Attended  [x] PROM/Stretching                   [x] Fluidotherapy          [x]  Paraffin                   [x]AAROM  [x] AROM             [] Iontophoresis: 4 mg/mL; Dexamethasone Sodium           [x] Desensitization                               [] Neuromuscular Re-education    [] Splinting       [x] Therapeutic Activity            [x] Pain Management with/without modalities PRN                 [x] Manual Therapy/Fascial release        [] ADL/IADL re-training        [x] Tendon Glides                   []Joint Protection/Training  []Ergonomics                             [x] Joint Mobilization        []Adaptive Equipment Assessment/Training                             [] Manual Edema Mobilization    [] Energy Conservation/Work Simplification  [x] GM/FM Coordination  []  Safety retraining/education per  individual diagnosis/goals   Scar management.                            GOALS (Long term same as Short term):  1. ) Patient will demonstrate good understanding of home program (exercises/activities/diagnosis/prognosis/goals) with good accuracy. Goal Met.  2, ) Patient will demonstrate increased active/passive range of motion of their Right to [facility] for ADL/IADL completion.    Goal Met.  3. ) Patient will demonstrate increased /pinch strength of

## 2018-10-19 ENCOUNTER — HOSPITAL ENCOUNTER (OUTPATIENT)
Dept: ULTRASOUND IMAGING | Age: 48
Discharge: HOME | End: 2018-10-19
Payer: COMMERCIAL

## 2018-10-19 DIAGNOSIS — N93.8 DUB (DYSFUNCTIONAL UTERINE BLEEDING): ICD-10-CM

## 2018-10-19 PROCEDURE — 76856 US EXAM PELVIC COMPLETE: CPT

## 2018-11-16 ENCOUNTER — HOSPITAL ENCOUNTER (OUTPATIENT)
Age: 48
Discharge: HOME | End: 2018-11-18
Payer: COMMERCIAL

## 2018-11-16 PROCEDURE — 87624 HPV HI-RISK TYP POOLED RSLT: CPT

## 2018-11-16 PROCEDURE — G0123 SCREEN CERV/VAG THIN LAYER: HCPCS

## 2018-12-06 LAB
CORRESPONDING PAP CASE #: NORMAL
HPV, HIGH RISK: NEGATIVE

## 2018-12-13 ENCOUNTER — HOSPITAL ENCOUNTER (OUTPATIENT)
Age: 48
Discharge: HOME | End: 2018-12-15
Payer: COMMERCIAL

## 2018-12-13 PROCEDURE — 88305 TISSUE EXAM BY PATHOLOGIST: CPT

## 2021-06-03 NOTE — BRIEF OP NOTE
Brief Postoperative Note  ______________________________________________________________    Patient: Adelso Espino  YOB: 1970  MRN: [id number]  Date of Procedure: 8/8/2018    Pre-Op Diagnosis: RIGHT CARPAL TUNNEL SYNDROME, RIGHT CUBITAL TUNNEL    Post-Op Diagnosis: Same       Procedure(s):  RIGHT CARPAL TUNNEL RELEASE  RIGHT ULNAR NERVE DECOMPRESSION WITH SUB MUSCULAR  TRANSPOSITION    Anesthesia: Monitor Anesthesia Care    Surgeon(s):  Monique Braswell MD    Staff:  Scrub Person First: Herbie Reyes  Physician Assistant: TRINY Yoon     Estimated Blood Loss: * No values recorded between 8/8/2018  7:11 AM and 8/8/2018  8:11 AM .5 mL    Complications: None    Specimens:   * No specimens in log *    Implants:  * No implants in log Sun Galan DO  Date: 8/8/2018  Time: 8:11 AM
VITALS: Reviewed  CONSTITUTIONAL: well developed, well nourished, in no acute distress, speaking in full sentences, nontoxic appearing  SKIN: warm, dry, no rash  HEAD: normocephalic, atraumatic  EYES: PERRL, EOMI, no conjunctival erythema, sclera clear  ENT: patent airway, moist mucous membranes  NECK: supple, no masses  CV:  regular rate, regular rhythm, 2+ radial pulses bilaterally  RESP: no wheezes, no rales, no rhonchi, normal work of breathing  ABD: soft, nontender, nondistended, no rebound, no guarding  MSK: normal ROM, no cyanosis, no edema  NEURO: alert, oriented x3  PSYCH: cooperative, appropriate

## 2022-10-12 ENCOUNTER — HOSPITAL ENCOUNTER (EMERGENCY)
Age: 52
Discharge: LEFT BEFORE DISCHARGE | End: 2022-10-12
Payer: COMMERCIAL

## 2022-10-12 ENCOUNTER — APPOINTMENT (OUTPATIENT)
Dept: GENERAL RADIOLOGY | Age: 52
End: 2022-10-12
Payer: COMMERCIAL

## 2022-10-12 VITALS
RESPIRATION RATE: 16 BRPM | BODY MASS INDEX: 18.25 KG/M2 | DIASTOLIC BLOOD PRESSURE: 79 MMHG | TEMPERATURE: 98.8 F | WEIGHT: 120 LBS | SYSTOLIC BLOOD PRESSURE: 137 MMHG | HEART RATE: 61 BPM | OXYGEN SATURATION: 99 %

## 2022-10-12 DIAGNOSIS — S60.10XA SUBUNGUAL HEMATOMA OF DIGIT OF HAND, INITIAL ENCOUNTER: ICD-10-CM

## 2022-10-12 DIAGNOSIS — S60.10XA CONTUSION OF FINGER WITH DAMAGE TO NAIL, UNSPECIFIED FINGER, UNSPECIFIED LATERALITY, INITIAL ENCOUNTER: Primary | ICD-10-CM

## 2022-10-12 PROCEDURE — 99211 OFF/OP EST MAY X REQ PHY/QHP: CPT

## 2022-10-12 PROCEDURE — 73130 X-RAY EXAM OF HAND: CPT

## 2022-10-12 NOTE — ED PROVIDER NOTES
Department of Emergency Medicine  [address] [place] [address]  Provider Note  Admit Date/Time: 10/12/2022  3:23 PM  Room: DAVID/DAVID  NAME: Hussain Ramirez  : 1970  MRN: [id number]     Chief Complaint:  Laceration (Cut her right middle finger yesterday, wants it checked out)    History of Present Illness        Edita Little is a 46 y.o. female who has a past medical history of:   Past Medical History:   Diagnosis Date    Carpal tunnel syndrome, right     Heart murmur     slight / no cardiologist / no issues    Sinus drainage     chronic on and off    Ulnar nerve compression, right     presents to the urgent care center by private car for evaluation of an injury to her right third finger. This happened yesterday afternoon. She shut her finger in a car door. She said it seems to becoming more swollen and there is a crack in the skin she was worried about so she came in to have it evaluated she said she uses her right hand all the time because she is a  and does also does a lot of woodworking at home    ROS    Pertinent positives and negatives are stated within HPI, all other systems reviewed and are negative. Past Surgical History:   Procedure Laterality Date    NV LAP,CHOLECYSTECTOMY/GRAPH N/A 2018    CHOLECYSTECTOMY LAPAROSCOPIC WITH IOC performed by Jasper Lara MD at [address] [place] [address] N/CARPAL TUNNEL SURG Right 2018    RIGHT CARPAL TUNNEL RELEASE performed by Reece Lacey MD at [place] [address] Right 2018    RIGHT ULNAR NERVE DECOMPRESSION WITH SUB MUSCULAR  TRANSPOSITION performed by Reeec Lacey MD at [place] [address] History:  reports that she quit smoking about 20 years ago. She has never used smokeless tobacco. She reports current alcohol use. She reports that she does not use drugs. Family History: family history is not on file. Allergies: Patient has no known allergies.     Physical Exam   Oxygen Saturation Interpretation: Normal.   ED Triage Vitals   BP Temp Temp src Heart Rate Resp SpO2 Height Weight   10/12/22 1530 10/12/22 1527 -- 10/12/22 1530 10/12/22 1527 10/12/22 1530 -- 10/12/22 1527   137/79 98.8 °F (37.1 °C)  61 16 99 %  120 lb (54.4 kg)       Physical Exam  Constitutional/General: Alert and oriented x3, well appearing, non toxic in NAD  HEENT:  NC/NT. Neck: Supple, full ROM,   Respiratory: . Not in respiratory distress  CV:  Regular rate. .  Musculoskeletal: Moves all extremities x 4. The distal aspect of the right third finger is edematous and ecchymotic she does have a slit in the skin on the palmar surface of the distal aspect. She e also has a subungual hematoma  Integument: skin warm and dry. No rashes. Lymphatic: no lymphadenopathy noted  Neurologic: GCS 15, no focal deficits,   Psychiatric: Normal Affect    Lab / Imaging Results   (All laboratory and radiology results have been personally reviewed by myself)  Labs:  No results found for this visit on 10/12/22. Imaging: All Radiology results interpreted by Radiologist unless otherwise noted. XR HAND RIGHT (MIN 3 VIEWS)   Final Result   No acute osseous abnormality. ED Course / Medical Decision Making   Medications - No data to display       Consult(s):   None        MDM:   I did obtain an x-ray of the right third finger. This happened yesterday. She has a subungual hematoma but small less than 25% but it would be too late to drain it at this point. SHe has a small slit in the finger the fingertip is ecchymotic and edematous. She did not want to wait on her x-rays she left before the x-rays were read and without further treatment , recommendations or  further evaluation by me. Assessment      1. Contusion of finger with damage to nail, unspecified finger, unspecified laterality, initial encounter    2.  Subungual hematoma of digit of hand, initial encounter      Plan   Eloped before treatment complete  New Medications     There are no discharge medications for this patient. Electronically signed by REECE Cavanaugh CNP   DD: 10/12/22  **This report was transcribed using voice recognition software. Every effort was made to ensure accuracy; however, inadvertent computerized transcription errors may be present.   END OF ED PROVIDER NOTE      REECE Cavanaugh CNP  10/12/22 0506

## (undated) DEVICE — ELECTRODE PT RET AD L9FT HI MOIST COND ADH HYDRGEL CORDED

## (undated) DEVICE — PADDING,UNDERCAST,COTTON, 3X4YD STERILE: Brand: MEDLINE

## (undated) DEVICE — LOOP VES W25MM THK1MM MAXI RED SIL FLD REPELLENT 100 PER

## (undated) DEVICE — GOWN,SIRUS,FABRNF,XL,20/CS: Brand: MEDLINE

## (undated) DEVICE — SOLUTION IV IRRIG POUR BRL 0.9% SODIUM CHL 2F7124

## (undated) DEVICE — GOWN,SIRUS,NONRNF,SETINSLV,XL,20/CS: Brand: MEDLINE

## (undated) DEVICE — TROCAR: Brand: KII FIOS FIRST ENTRY

## (undated) DEVICE — MARKER,SKIN,WI/RULER AND LABELS: Brand: MEDLINE

## (undated) DEVICE — SPLINT ORTH W5XL30IN PLSTR OF PARIS FAST IMMOB OF FRAC HI

## (undated) DEVICE — STRIP,CLOSURE,WOUND,MEDI-STRIP,1/2X4: Brand: MEDLINE

## (undated) DEVICE — PMI PTFE COATED LAPAROSCOPIC WIRE L-HOOK 44 CM: Brand: PMI

## (undated) DEVICE — [HIGH FLOW INSUFFLATOR,  DO NOT USE IF PACKAGE IS DAMAGED,  KEEP DRY,  KEEP AWAY FROM SUNLIGHT,  PROTECT FROM HEAT AND RADIOACTIVE SOURCES.]: Brand: PNEUMOSURE

## (undated) DEVICE — LAPAROSCOPIC SCISSORS: Brand: EPIX LAPAROSCOPIC SCISSORS

## (undated) DEVICE — ZIMMER® STERILE DISPOSABLE TOURNIQUET CUFF WITH PLC, DUAL PORT, SINGLE BLADDER, 18 IN. (46 CM)

## (undated) DEVICE — SURGICAL PROCEDURE PACK HND

## (undated) DEVICE — PMI PTFE COATED LAPAROSCOPIC WIRE L-HOOK 33 CM: Brand: PMI

## (undated) DEVICE — CONTROL SYRINGE LUER-LOCK TIP: Brand: MONOJECT

## (undated) DEVICE — BANDAGE COMPR W3INXL5YD WHT BGE POLY COT M E WRP WV HK AND

## (undated) DEVICE — DRAPE 64X41IN RADIOLOGY C ARM EQUIP STER

## (undated) DEVICE — PADDING,UNDERCAST,COTTON, 4"X4YD STERILE: Brand: MEDLINE

## (undated) DEVICE — PADDING UNDERCAST W4INXL4YD COT FBR LO LINTING WYTEX

## (undated) DEVICE — INSUFFLATION NEEDLE TO ESTABLISH PNEUMOPERITONEUM.: Brand: INSUFFLATION NEEDLE

## (undated) DEVICE — GLOVE ORANGE PI 7 1/2   MSG9075

## (undated) DEVICE — 4-PORT MANIFOLD: Brand: NEPTUNE 2

## (undated) DEVICE — SET ENDO INSTR LAPAROSCOPIC INCISIONAL

## (undated) DEVICE — MASTISOL ADHESIVE LIQ 2/3ML

## (undated) DEVICE — COOK ENDOSCOPIC CHOLANGIOGRAPHY SET: Brand: COOK

## (undated) DEVICE — 20 ML SYRINGE REGULAR TIP: Brand: MONOJECT

## (undated) DEVICE — INTENDED FOR TISSUE SEPARATION, AND OTHER PROCEDURES THAT REQUIRE A SHARP SURGICAL BLADE TO PUNCTURE OR CUT.: Brand: BARD-PARKER ® STAINLESS STEEL BLADES

## (undated) DEVICE — CHLORAPREP 26ML ORANGE

## (undated) DEVICE — STANDARD HYPODERMIC NEEDLE,POLYPROPYLENE HUB: Brand: MONOJECT

## (undated) DEVICE — GARMENT,MEDLINE,DVT,INT,CALF,MED, GEN2: Brand: MEDLINE

## (undated) DEVICE — APPLIER CLP M L L11.4IN DIA10MM ENDOSCP ROT MULT FOR LIG

## (undated) DEVICE — MEDIA CONTRAST ISOVUE GLASS VIALS 250 50ML

## (undated) DEVICE — PLUMEPORT LAPAROSCOPIC SMOKE FILTRATION DEVICE: Brand: PLUMEPORT ACTIV

## (undated) DEVICE — DOUBLE BASIN SET: Brand: MEDLINE INDUSTRIES, INC.

## (undated) DEVICE — SHEET,DRAPE,40X58,STERILE: Brand: MEDLINE

## (undated) DEVICE — CAMERA STRYKER 1488 HD GEN

## (undated) DEVICE — DRAPE,U/ SHT,SPLIT,PLAS,STERIL: Brand: MEDLINE

## (undated) DEVICE — SKIN AFFIX SURG ADHESIVE 72/CS 0.55ML: Brand: MEDLINE

## (undated) DEVICE — SUTURE FIBERWIRE SZ 2 W/ TAPERED NEEDLE BLUE L38IN NONABSORB BLU L26.5MM 1/2 CIRCLE AR7200

## (undated) DEVICE — SET ENDO INSTR RED YEL LAPAROSCOPIC

## (undated) DEVICE — Device

## (undated) DEVICE — COVER HNDL LT DISP

## (undated) DEVICE — PACK SURG LAP CHOLE CUSTOM

## (undated) DEVICE — CRADLE ARM W8.75XH12.5XL16IN FOAM SUPP ELEVATION VENT

## (undated) DEVICE — SOLUTION IV IRRIG WATER 1000ML POUR BRL 2F7114

## (undated) DEVICE — TROCAR: Brand: KII SLEEVE